# Patient Record
Sex: FEMALE | Race: WHITE | URBAN - METROPOLITAN AREA
[De-identification: names, ages, dates, MRNs, and addresses within clinical notes are randomized per-mention and may not be internally consistent; named-entity substitution may affect disease eponyms.]

---

## 2017-09-15 ENCOUNTER — EMERGENCY (EMERGENCY)
Facility: HOSPITAL | Age: 82
LOS: 0 days | Discharge: HOME | End: 2017-09-15

## 2017-09-15 DIAGNOSIS — T16.2XXA FOREIGN BODY IN LEFT EAR, INITIAL ENCOUNTER: ICD-10-CM

## 2017-09-15 DIAGNOSIS — E78.00 PURE HYPERCHOLESTEROLEMIA, UNSPECIFIED: ICD-10-CM

## 2017-09-15 DIAGNOSIS — I10 ESSENTIAL (PRIMARY) HYPERTENSION: ICD-10-CM

## 2017-09-15 DIAGNOSIS — Z79.899 OTHER LONG TERM (CURRENT) DRUG THERAPY: ICD-10-CM

## 2018-10-16 ENCOUNTER — EMERGENCY (EMERGENCY)
Facility: HOSPITAL | Age: 83
LOS: 0 days | Discharge: HOME | End: 2018-10-17
Attending: EMERGENCY MEDICINE | Admitting: EMERGENCY MEDICINE

## 2018-10-16 VITALS
TEMPERATURE: 98 F | DIASTOLIC BLOOD PRESSURE: 113 MMHG | HEART RATE: 67 BPM | RESPIRATION RATE: 20 BRPM | SYSTOLIC BLOOD PRESSURE: 216 MMHG | HEIGHT: 60 IN | OXYGEN SATURATION: 96 % | WEIGHT: 205.91 LBS

## 2018-10-16 VITALS — DIASTOLIC BLOOD PRESSURE: 76 MMHG | HEART RATE: 63 BPM | OXYGEN SATURATION: 99 % | SYSTOLIC BLOOD PRESSURE: 168 MMHG

## 2018-10-16 DIAGNOSIS — R07.9 CHEST PAIN, UNSPECIFIED: ICD-10-CM

## 2018-10-16 DIAGNOSIS — R07.89 OTHER CHEST PAIN: ICD-10-CM

## 2018-10-16 DIAGNOSIS — Z87.19 PERSONAL HISTORY OF OTHER DISEASES OF THE DIGESTIVE SYSTEM: ICD-10-CM
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
## 2018-10-16 DIAGNOSIS — N39.0 URINARY TRACT INFECTION, SITE NOT SPECIFIED: ICD-10-CM
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
## 2018-10-16 DIAGNOSIS — I10 ESSENTIAL (PRIMARY) HYPERTENSION: ICD-10-CM

## 2018-10-16 DIAGNOSIS — Z79.899 OTHER LONG TERM (CURRENT) DRUG THERAPY: ICD-10-CM

## 2018-10-16 DIAGNOSIS — E78.5 HYPERLIPIDEMIA, UNSPECIFIED: ICD-10-CM

## 2018-10-16 DIAGNOSIS — E78.00 PURE HYPERCHOLESTEROLEMIA, UNSPECIFIED: ICD-10-CM

## 2018-10-16 LAB
ALBUMIN SERPL ELPH-MCNC: 4.2 G/DL — SIGNIFICANT CHANGE UP (ref 3.5–5.2)
ALP SERPL-CCNC: 116 U/L — HIGH (ref 30–115)
ALT FLD-CCNC: 22 U/L — SIGNIFICANT CHANGE UP (ref 0–41)
ANION GAP SERPL CALC-SCNC: 17 MMOL/L — HIGH (ref 7–14)
APPEARANCE UR: ABNORMAL
APTT BLD: 24.3 SEC — LOW (ref 27–39.2)
AST SERPL-CCNC: 30 U/L — SIGNIFICANT CHANGE UP (ref 0–41)
BACTERIA # UR AUTO: ABNORMAL
BILIRUB SERPL-MCNC: 0.7 MG/DL — SIGNIFICANT CHANGE UP (ref 0.2–1.2)
BILIRUB UR-MCNC: NEGATIVE — SIGNIFICANT CHANGE UP
BUN SERPL-MCNC: 11 MG/DL — SIGNIFICANT CHANGE UP (ref 10–20)
CALCIUM SERPL-MCNC: 9.2 MG/DL — SIGNIFICANT CHANGE UP (ref 8.5–10.1)
CHLORIDE SERPL-SCNC: 100 MMOL/L — SIGNIFICANT CHANGE UP (ref 98–110)
CO2 SERPL-SCNC: 23 MMOL/L — SIGNIFICANT CHANGE UP (ref 17–32)
COLOR SPEC: YELLOW — SIGNIFICANT CHANGE UP
COMMENT - URINE: SIGNIFICANT CHANGE UP
CREAT SERPL-MCNC: 0.9 MG/DL — SIGNIFICANT CHANGE UP (ref 0.7–1.5)
DIFF PNL FLD: ABNORMAL
EPI CELLS # UR: ABNORMAL /HPF
GLUCOSE SERPL-MCNC: 131 MG/DL — HIGH (ref 70–99)
GLUCOSE UR QL: NEGATIVE MG/DL — SIGNIFICANT CHANGE UP
HCT VFR BLD CALC: 43.8 % — SIGNIFICANT CHANGE UP (ref 37–47)
HGB BLD-MCNC: 14.1 G/DL — SIGNIFICANT CHANGE UP (ref 12–16)
INR BLD: 1.06 RATIO — SIGNIFICANT CHANGE UP (ref 0.65–1.3)
KETONES UR-MCNC: NEGATIVE — SIGNIFICANT CHANGE UP
LACTATE SERPL-SCNC: 1.7 MMOL/L — SIGNIFICANT CHANGE UP (ref 0.5–2.2)
LEUKOCYTE ESTERASE UR-ACNC: ABNORMAL
LIDOCAIN IGE QN: 31 U/L — SIGNIFICANT CHANGE UP (ref 7–60)
MCHC RBC-ENTMCNC: 28.4 PG — SIGNIFICANT CHANGE UP (ref 27–31)
MCHC RBC-ENTMCNC: 32.2 G/DL — SIGNIFICANT CHANGE UP (ref 32–37)
MCV RBC AUTO: 88.3 FL — SIGNIFICANT CHANGE UP (ref 81–99)
NITRITE UR-MCNC: POSITIVE
NRBC # BLD: 0 /100 WBCS — SIGNIFICANT CHANGE UP (ref 0–0)
PH UR: 6 — SIGNIFICANT CHANGE UP (ref 5–8)
PLATELET # BLD AUTO: 254 K/UL — SIGNIFICANT CHANGE UP (ref 130–400)
POTASSIUM SERPL-MCNC: 4.1 MMOL/L — SIGNIFICANT CHANGE UP (ref 3.5–5)
POTASSIUM SERPL-SCNC: 4.1 MMOL/L — SIGNIFICANT CHANGE UP (ref 3.5–5)
PROT SERPL-MCNC: 7.3 G/DL — SIGNIFICANT CHANGE UP (ref 6–8)
PROT UR-MCNC: NEGATIVE MG/DL — SIGNIFICANT CHANGE UP
PROTHROM AB SERPL-ACNC: 11.5 SEC — SIGNIFICANT CHANGE UP (ref 9.95–12.87)
RBC # BLD: 4.96 M/UL — SIGNIFICANT CHANGE UP (ref 4.2–5.4)
RBC # FLD: 14 % — SIGNIFICANT CHANGE UP (ref 11.5–14.5)
RBC CASTS # UR COMP ASSIST: SIGNIFICANT CHANGE UP /HPF
SODIUM SERPL-SCNC: 140 MMOL/L — SIGNIFICANT CHANGE UP (ref 135–146)
SP GR SPEC: 1.01 — SIGNIFICANT CHANGE UP (ref 1.01–1.03)
TROPONIN T SERPL-MCNC: <0.01 NG/ML — SIGNIFICANT CHANGE UP
UROBILINOGEN FLD QL: 0.2 MG/DL — SIGNIFICANT CHANGE UP (ref 0.2–0.2)
WBC # BLD: 8.28 K/UL — SIGNIFICANT CHANGE UP (ref 4.8–10.8)
WBC # FLD AUTO: 8.28 K/UL — SIGNIFICANT CHANGE UP (ref 4.8–10.8)
WBC UR QL: ABNORMAL /HPF

## 2018-10-16 RX ORDER — CEFTRIAXONE 500 MG/1
1 INJECTION, POWDER, FOR SOLUTION INTRAMUSCULAR; INTRAVENOUS ONCE
Qty: 0 | Refills: 0 | Status: COMPLETED | OUTPATIENT
Start: 2018-10-16 | End: 2018-10-16

## 2018-10-16 RX ADMIN — CEFTRIAXONE 100 GRAM(S): 500 INJECTION, POWDER, FOR SOLUTION INTRAMUSCULAR; INTRAVENOUS at 23:58

## 2018-10-16 NOTE — ED PROVIDER NOTE - OBJECTIVE STATEMENT
89 year old female brought in by family from out patient urgent care center states that today around 1230pm she tripped over her shoes and landed on her right side. patient was able to sit up and bystanders were able to help her up. patient at that time refused EMS and she drove home and went to sleep. Pt states that since fall she has been having chest pain described a pressure and is non radiating. no shortness of breath, no back pain, no headache, no neck pain, no abd pain, no nausea, vomiting, diarrhea, no fever/chills, no numbness no confusion. patient was at urgent care center and was had partial EKG done and was told to come to the ER because their machine only was producing a few lines.

## 2018-10-16 NOTE — ED PROVIDER NOTE - PHYSICAL EXAMINATION
Physical Exam    Vital Signs: I have reviewed the initial vital signs.  Constitutional: well-nourished, appears stated age, no acute distress  Eyes: Conjunctiva pink, Sclera clear, PERRLA, EOMI.  Cardiovascular: S1 and S2, regular rate, regular rhythm, well-perfused extremities, radial pulses equal and 2+  Respiratory: unlabored respiratory effort, clear to auscultation bilaterally no wheezing, rales and rhonchi + chest wall tenderenss anterior no crepdius no step deformity no ecchymosis   Gastrointestinal: soft, non-tender abdomen, no pulsatile mass, normal bowl sounds  Musculoskeletal: supple neck, no lower extremity edema, no midline tenderness  Integumentary: warm, dry, no rash  Neurologic: awake, alert, cranial nerves II-XII grossly intact, extremities’ motor and sensory functions grossly intact  Psychiatric: appropriate mood, appropriate affect

## 2018-10-16 NOTE — ED PROVIDER NOTE - CARE PLAN
Principal Discharge DX:	Fall  Secondary Diagnosis:	Chest pain  Secondary Diagnosis:	UTI (urinary tract infection)

## 2018-10-16 NOTE — ED ADULT TRIAGE NOTE - CHIEF COMPLAINT QUOTE
pt states she fell at 12:10 pm today outside and c/o pain to right side of chest, denies LOC. pt sent by urgent care center for possible ekg changes

## 2018-10-16 NOTE — ED PROVIDER NOTE - MEDICAL DECISION MAKING DETAILS
89yF pmhx HTN HLD  - cardiology eval > 15 years ago p/w chest pain sp fall   - pt  and family at bedside explain - pt was walking - not using her walker tripped  - fell onto right side  -  witnessed by bysstanders -  assisted up - this occured 89yF pmhx HTN HLD  - cardiology eval > 15 years ago p/w chest pain sp fall   - pt  and family at bedside explain - pt was walking - not using her walker tripped  - fell onto right side  -  witnessed by bystanders -  assisted up - this occurred 1230pm  - pt refused  ambulance call -  pt drove home  - sat in recliner - patient  has been having retrosternal chest pressure since fall -  no sob no diaphoresis . + nausea no vomiting, no headache no neck pain no extremity pain ), no neck pain abdominal pain no paresthesias numbness.  PE" Alert and oriented, GCS 15.  PERRL, EOMI, no entrapment.  No raccoon or eagle sign.  No hemotympanum.  NCAT.  Neck is supple, no midline C-Spine tenderness.  CN 2-12 intact.  Motor strength and sensory response is symmetric.  CB intact.  CVS RRR.  Resp CTA b/l.  No distress, speaking clearly.  abd soft NT/ND.  No shoulder, elbow or hand tenderness.  2+ Radial pulse b/l and equal. Full ROM at all joints of b/l UE. No midline vertebral tenderness.  No rib tenderness, no crepitus. No ecchymosis to abd wall, back wall, or chest wall. Pelvis is stable. Hips non tender.  Full ROM at hips, knees and ankles.  No shortening, no rotation.  NVI distally. 89yF pmhx HTN HLD  - cardiology eval > 15 years ago p/w chest pain sp fall   - pt  and family at bedside explain - pt was walking - not using her walker tripped  - fell onto right side  -  witnessed by bystanders -  assisted up - this occurred 1230pm  - pt refused  ambulance call -  pt drove home  - sat in recliner - patient  has been having retrosternal chest pressure since fall -  no sob no diaphoresis . + nausea no vomiting, no headache no neck pain no extremity pain ), no neck pain abdominal pain no paresthesias numbness.  pt went to [facility] after daughter came to her house 2.2chest pain -  EKG machine not functioning - sent to ED   PE" Alert and oriented, GCS 15.  PERRL, EOMI, no entrapment.  No raccoon or eagle sign.  No hemotympanum.  NCAT.  Neck is supple, no midline C-Spine tenderness.  CN 2-12 intact.  Motor strength and sensory response is symmetric.  CB intact.  CVS RRR.  Resp CTA b/l.  No distress, speaking clearly.  abd soft NT/ND.  No shoulder, elbow or hand tenderness.  2+ Radial pulse b/l and equal. Full ROM at all joints of b/l UE. No midline vertebral tenderness.  No rib tenderness, no crepitus. No ecchymosis to abd wall, back wall, or chest wall. Pelvis is stable. Hips non tender.  Full ROM at hips, knees and ankles.  No shortening, no rotation.  NVI distally.

## 2018-10-16 NOTE — ED PROVIDER NOTE - PROGRESS NOTE DETAILS
Results d/w patient and family and copies of results provided.  Pt instructed to return if any worsening symptoms or concerns.  They verbalize understanding. Aware of nodules and f/u with primary medical provider and cardiology. All questions answered by three daughter at bedside. Pt and family comfortable with plan and f/u.

## 2018-10-17 RX ORDER — CEFDINIR 250 MG/5ML
1 POWDER, FOR SUSPENSION ORAL
Qty: 20 | Refills: 0
Start: 2018-10-17 | End: 2018-10-26

## 2018-11-30 ENCOUNTER — EMERGENCY (EMERGENCY)
Facility: HOSPITAL | Age: 83
LOS: 0 days | Discharge: HOME | End: 2018-11-30
Attending: EMERGENCY MEDICINE | Admitting: EMERGENCY MEDICINE

## 2018-11-30 VITALS
SYSTOLIC BLOOD PRESSURE: 171 MMHG | DIASTOLIC BLOOD PRESSURE: 85 MMHG | TEMPERATURE: 97 F | WEIGHT: 192.02 LBS | RESPIRATION RATE: 95 BRPM | OXYGEN SATURATION: 95 % | HEIGHT: 60 IN | HEART RATE: 71 BPM

## 2018-11-30 VITALS
SYSTOLIC BLOOD PRESSURE: 145 MMHG | OXYGEN SATURATION: 95 % | HEART RATE: 74 BPM | RESPIRATION RATE: 17 BRPM | TEMPERATURE: 98 F | DIASTOLIC BLOOD PRESSURE: 67 MMHG

## 2018-11-30 DIAGNOSIS — E78.5 HYPERLIPIDEMIA, UNSPECIFIED: ICD-10-CM

## 2018-11-30 DIAGNOSIS — Z90.49 ACQUIRED ABSENCE OF OTHER SPECIFIED PARTS OF DIGESTIVE TRACT: ICD-10-CM

## 2018-11-30 DIAGNOSIS — Z98.890 OTHER SPECIFIED POSTPROCEDURAL STATES: ICD-10-CM

## 2018-11-30 DIAGNOSIS — R06.02 SHORTNESS OF BREATH: ICD-10-CM

## 2018-11-30 DIAGNOSIS — I10 ESSENTIAL (PRIMARY) HYPERTENSION: ICD-10-CM

## 2018-11-30 DIAGNOSIS — E78.00 PURE HYPERCHOLESTEROLEMIA, UNSPECIFIED: ICD-10-CM

## 2018-11-30 DIAGNOSIS — J18.9 PNEUMONIA, UNSPECIFIED ORGANISM: ICD-10-CM

## 2018-11-30 DIAGNOSIS — Z79.899 OTHER LONG TERM (CURRENT) DRUG THERAPY: ICD-10-CM

## 2018-11-30 DIAGNOSIS — Z79.2 LONG TERM (CURRENT) USE OF ANTIBIOTICS: ICD-10-CM

## 2018-11-30 DIAGNOSIS — K25.5 CHRONIC OR UNSPECIFIED GASTRIC ULCER WITH PERFORATION: Chronic | ICD-10-CM

## 2018-11-30 DIAGNOSIS — Z90.710 ACQUIRED ABSENCE OF BOTH CERVIX AND UTERUS: ICD-10-CM

## 2018-11-30 DIAGNOSIS — Z87.11 PERSONAL HISTORY OF PEPTIC ULCER DISEASE: ICD-10-CM

## 2018-11-30 DIAGNOSIS — E11.9 TYPE 2 DIABETES MELLITUS WITHOUT COMPLICATIONS: ICD-10-CM

## 2018-11-30 DIAGNOSIS — Z90.49 ACQUIRED ABSENCE OF OTHER SPECIFIED PARTS OF DIGESTIVE TRACT: Chronic | ICD-10-CM

## 2018-11-30 DIAGNOSIS — Z90.710 ACQUIRED ABSENCE OF BOTH CERVIX AND UTERUS: Chronic | ICD-10-CM

## 2018-11-30 DIAGNOSIS — Z87.891 PERSONAL HISTORY OF NICOTINE DEPENDENCE: ICD-10-CM

## 2018-11-30 PROBLEM — K27.9 PEPTIC ULCER, SITE UNSPECIFIED, UNSPECIFIED AS ACUTE OR CHRONIC, WITHOUT HEMORRHAGE OR PERFORATION: Chronic | Status: ACTIVE | Noted: 2018-10-16

## 2018-11-30 LAB
ANION GAP SERPL CALC-SCNC: 16 MMOL/L — HIGH (ref 7–14)
BASOPHILS # BLD AUTO: 0.05 K/UL — SIGNIFICANT CHANGE UP (ref 0–0.2)
BASOPHILS NFR BLD AUTO: 0.4 % — SIGNIFICANT CHANGE UP (ref 0–1)
BUN SERPL-MCNC: 12 MG/DL — SIGNIFICANT CHANGE UP (ref 10–20)
CALCIUM SERPL-MCNC: 9.3 MG/DL — SIGNIFICANT CHANGE UP (ref 8.5–10.1)
CHLORIDE SERPL-SCNC: 94 MMOL/L — LOW (ref 98–110)
CO2 SERPL-SCNC: 28 MMOL/L — SIGNIFICANT CHANGE UP (ref 17–32)
CREAT SERPL-MCNC: 1 MG/DL — SIGNIFICANT CHANGE UP (ref 0.7–1.5)
EOSINOPHIL # BLD AUTO: 0 K/UL — SIGNIFICANT CHANGE UP (ref 0–0.7)
EOSINOPHIL NFR BLD AUTO: 0 % — SIGNIFICANT CHANGE UP (ref 0–8)
GLUCOSE SERPL-MCNC: 177 MG/DL — HIGH (ref 70–99)
HCT VFR BLD CALC: 44.9 % — SIGNIFICANT CHANGE UP (ref 37–47)
HGB BLD-MCNC: 15 G/DL — SIGNIFICANT CHANGE UP (ref 12–16)
IMM GRANULOCYTES NFR BLD AUTO: 2.3 % — HIGH (ref 0.1–0.3)
LACTATE SERPL-SCNC: 1.8 MMOL/L — SIGNIFICANT CHANGE UP (ref 0.5–2.2)
LYMPHOCYTES # BLD AUTO: 2.7 K/UL — SIGNIFICANT CHANGE UP (ref 1.2–3.4)
LYMPHOCYTES # BLD AUTO: 22.2 % — SIGNIFICANT CHANGE UP (ref 20.5–51.1)
MCHC RBC-ENTMCNC: 28.3 PG — SIGNIFICANT CHANGE UP (ref 27–31)
MCHC RBC-ENTMCNC: 33.4 G/DL — SIGNIFICANT CHANGE UP (ref 32–37)
MCV RBC AUTO: 84.7 FL — SIGNIFICANT CHANGE UP (ref 81–99)
MONOCYTES # BLD AUTO: 1 K/UL — HIGH (ref 0.1–0.6)
MONOCYTES NFR BLD AUTO: 8.2 % — SIGNIFICANT CHANGE UP (ref 1.7–9.3)
NEUTROPHILS # BLD AUTO: 8.14 K/UL — HIGH (ref 1.4–6.5)
NEUTROPHILS NFR BLD AUTO: 66.9 % — SIGNIFICANT CHANGE UP (ref 42.2–75.2)
NRBC # BLD: 0 /100 WBCS — SIGNIFICANT CHANGE UP (ref 0–0)
PLATELET # BLD AUTO: 424 K/UL — HIGH (ref 130–400)
POTASSIUM SERPL-MCNC: 3.6 MMOL/L — SIGNIFICANT CHANGE UP (ref 3.5–5)
POTASSIUM SERPL-SCNC: 3.6 MMOL/L — SIGNIFICANT CHANGE UP (ref 3.5–5)
RBC # BLD: 5.3 M/UL — SIGNIFICANT CHANGE UP (ref 4.2–5.4)
RBC # FLD: 13.2 % — SIGNIFICANT CHANGE UP (ref 11.5–14.5)
SODIUM SERPL-SCNC: 138 MMOL/L — SIGNIFICANT CHANGE UP (ref 135–146)
WBC # BLD: 12.17 K/UL — HIGH (ref 4.8–10.8)
WBC # FLD AUTO: 12.17 K/UL — HIGH (ref 4.8–10.8)

## 2018-11-30 NOTE — ED PROVIDER NOTE - ATTENDING CONTRIBUTION TO CARE
89F hx of htn, hld, dm, presenting from UC with LLL PNA. Endorses productive cough sputum, with mild sob with exertion. No fever or chills. 89F hx of htn, hld, dm, presenting from  with LLL PNA. Endorses productive cough sputum, with mild sob with exertion. No fever or chills.  VITAL SIGNS: noted  CONSTITUTIONAL: Well-developed; well-nourished; in no acute distress  HEAD: Normocephalic; atraumatic  EYES: conjunctiva and sclera clear  ENT: No nasal discharge; airway clear. MMM  NECK: Supple; non tender. No anterior cervical lymphadenopathy noted  CARD: Regular rate and rhythm  RESP: CTAB/L, no wheezes, rales or rhonchi  ABD: Normal bowel sounds; soft; non-distended; non-tender; No CVAT  EXT: Normal ROM. No calf tenderness or edema. Distal pulses intact  NEURO: Alert, oriented. Grossly unremarkable. No focal deficits  SKIN: Skin exam is warm and dry, no acute rash  MS: No midline spinal tenderness   Cough, sob - consider viral vs bacterial pneumonia, bronchitis, low suspicion for acs - labs, xr, anticipate dc with po abx.

## 2018-11-30 NOTE — ED PROVIDER NOTE - PROGRESS NOTE DETAILS
KATHI White: I was directly involved in the care and management of this patient while supervising PA Fellow

## 2018-11-30 NOTE — ED PROVIDER NOTE - PHYSICAL EXAMINATION
GEN: Alert & Oriented x 3, No acute distress. Calm, appropriate.  HEENT: Oral mucosa pink, moist without lesions. No pharyngeal injection noted. No exudate. No cervical lymphadenopathy.  Eyes: PERRL. No conjunctival injection. No scleral icterus.   RESP: Lungs clear to auscult bilat. no wheezes, rhonchi or rales. No retractions. Equal air entry.  CARDIO: regular rate and rhythm, no murmurs, rubs or gallops. Normal S1, S2.  Radial pulses 2+ bilaterally. No lower extremity edema.  ABD: Soft, Nondistended. No rebound tenderness/guarding.  No pulsatile mass. No tenderness with light and deep palpation.  MS: Full ROM of extremities.  SKIN: no rashes/lesions, no petechiae, no ecchymosis.  NEURO: CN II-XII grossly intact.  Speech and cognition normal.

## 2018-11-30 NOTE — ED PROVIDER NOTE - NSFOLLOWUPINSTRUCTIONS_ED_ALL_ED_FT
Pneumonia    Pneumonia is an infection of the lungs. Pneumonia may be caused by bacteria, viruses, or funguses. Symptoms include coughing, fever, chest pain when breathing deeply or coughing, shortness of breath, fatigue, or muscle aches. Pneumonia can be diagnosed with a medical history and physical exam, as well as other tests which may include a chest X-ray. If you were prescribed an antibiotic medicine, take it as told by your health care provider and do not stop taking the antibiotic even if you start to feel better. Do not use tobacco products, including cigarettes, chewing tobacco, and e-cigarettes.    SEEK IMMEDIATE MEDICAL CARE IF YOU HAVE ANY OF THE FOLLOWING SYMPTOMS: worsening shortness of breath, worsening chest pain, coughing up blood, change in mental status, lightheadedness/dizziness.

## 2018-11-30 NOTE — ED PROVIDER NOTE - MEDICAL DECISION MAKING DETAILS
Xr conerning for pneumonia. Pt remains well appearing. No hypoxia. Afebrile. Discussed risk benefit of admission vs outpatient treatment for CAP with pt and family. Pt has strong preference for outpatient treatment. Family aware and agrees with plan for dc. Patient was given strict return and follow up precautions. The patient has been informed of all concerning signs and symptoms to return to Emergency Department, the necessity to follow up with PMD/Clinic/follow up provided within 2-3 days was explained, and the patient reports understanding of above with capacity and insight.

## 2018-11-30 NOTE — ED PROVIDER NOTE - NS ED ROS FT
GEN: (-) fever, (-) chills  HEENT: (-) vision changes, (-) HA, (-) sore throat, (-) ear pain  CV: (-) chest pain, (-) palpitations, (-) edema  PULM: (+) cough, (-) wheezing, (+) shortness of breath, (-) orthopnea, (-) hemoptysis   GI: (-) abdominal pain,(-) Nausea, (-) Vomiting, (-) Diarrhea, (-) Melena  NEURO: (-) weakness, (-) paresthesias, (-) syncope  : (-) dysuria, (-) frequency, (-) urgency  MS: (-) back pain, (-) joint pain, (-)myalgias, (-) swelling  SKIN: (-) rashes, (-) new lesions, (-) pruritus, (-) jaundice  HEME: (-) bleeding, (-) ecchymosis

## 2018-11-30 NOTE — ED ADULT NURSE NOTE - NSIMPLEMENTINTERV_GEN_ALL_ED
Implemented All Universal Safety Interventions:  [place] to call system. Call bell, personal items and telephone within reach. Instruct patient to call for assistance. Room bathroom lighting operational. Non-slip footwear when patient is off stretcher. Physically safe environment: no spills, clutter or unnecessary equipment. Stretcher in lowest position, wheels locked, appropriate side rails in place.

## 2018-11-30 NOTE — ED PROVIDER NOTE - OBJECTIVE STATEMENT
The patient is a 89y Female with PMH of HTN, Hyperlipidemia and DM is presenting to ED from Urgent care for cough and concern for RLL pneumonia. Patient states she has had a productive cough over the past 1 1/2wks. She notes recent URI symptoms and some shortness of breath worse with exertion. She denies chest pain, fever/chills, abdominal pain n/v/d. She notes she has had chronic diarrhea over 6mo, but states she has had normal BM over the past 2 days.

## 2019-09-26 ENCOUNTER — EMERGENCY (EMERGENCY)
Facility: HOSPITAL | Age: 84
LOS: 0 days | Discharge: HOME | End: 2019-09-26
Attending: EMERGENCY MEDICINE | Admitting: EMERGENCY MEDICINE
Payer: MEDICARE

## 2019-09-26 VITALS
DIASTOLIC BLOOD PRESSURE: 67 MMHG | HEART RATE: 97 BPM | WEIGHT: 199.96 LBS | OXYGEN SATURATION: 96 % | SYSTOLIC BLOOD PRESSURE: 126 MMHG | TEMPERATURE: 96 F | RESPIRATION RATE: 21 BRPM

## 2019-09-26 VITALS
RESPIRATION RATE: 20 BRPM | OXYGEN SATURATION: 97 % | TEMPERATURE: 98 F | SYSTOLIC BLOOD PRESSURE: 135 MMHG | DIASTOLIC BLOOD PRESSURE: 74 MMHG

## 2019-09-26 DIAGNOSIS — R21 RASH AND OTHER NONSPECIFIC SKIN ERUPTION: ICD-10-CM

## 2019-09-26 DIAGNOSIS — R06.2 WHEEZING: ICD-10-CM

## 2019-09-26 DIAGNOSIS — I10 ESSENTIAL (PRIMARY) HYPERTENSION: ICD-10-CM

## 2019-09-26 DIAGNOSIS — E78.00 PURE HYPERCHOLESTEROLEMIA, UNSPECIFIED: ICD-10-CM

## 2019-09-26 DIAGNOSIS — R06.02 SHORTNESS OF BREATH: ICD-10-CM

## 2019-09-26 DIAGNOSIS — K25.5 CHRONIC OR UNSPECIFIED GASTRIC ULCER WITH PERFORATION: Chronic | ICD-10-CM

## 2019-09-26 DIAGNOSIS — E66.9 OBESITY, UNSPECIFIED: ICD-10-CM

## 2019-09-26 DIAGNOSIS — Z90.710 ACQUIRED ABSENCE OF BOTH CERVIX AND UTERUS: Chronic | ICD-10-CM

## 2019-09-26 DIAGNOSIS — Z79.52 LONG TERM (CURRENT) USE OF SYSTEMIC STEROIDS: ICD-10-CM

## 2019-09-26 DIAGNOSIS — R05 COUGH: ICD-10-CM

## 2019-09-26 DIAGNOSIS — Z90.49 ACQUIRED ABSENCE OF OTHER SPECIFIED PARTS OF DIGESTIVE TRACT: Chronic | ICD-10-CM

## 2019-09-26 DIAGNOSIS — Z87.891 PERSONAL HISTORY OF NICOTINE DEPENDENCE: ICD-10-CM

## 2019-09-26 LAB
ALBUMIN SERPL ELPH-MCNC: 4.1 G/DL — SIGNIFICANT CHANGE UP (ref 3.5–5.2)
ALP SERPL-CCNC: 111 U/L — SIGNIFICANT CHANGE UP (ref 30–115)
ALT FLD-CCNC: 18 U/L — SIGNIFICANT CHANGE UP (ref 0–41)
ANION GAP SERPL CALC-SCNC: 12 MMOL/L — SIGNIFICANT CHANGE UP (ref 7–14)
APTT BLD: 25.8 SEC — LOW (ref 27–39.2)
AST SERPL-CCNC: 21 U/L — SIGNIFICANT CHANGE UP (ref 0–41)
BILIRUB SERPL-MCNC: 0.7 MG/DL — SIGNIFICANT CHANGE UP (ref 0.2–1.2)
BUN SERPL-MCNC: 14 MG/DL — SIGNIFICANT CHANGE UP (ref 10–20)
CALCIUM SERPL-MCNC: 9.3 MG/DL — SIGNIFICANT CHANGE UP (ref 8.5–10.1)
CHLORIDE SERPL-SCNC: 103 MMOL/L — SIGNIFICANT CHANGE UP (ref 98–110)
CO2 SERPL-SCNC: 26 MMOL/L — SIGNIFICANT CHANGE UP (ref 17–32)
CREAT SERPL-MCNC: 1 MG/DL — SIGNIFICANT CHANGE UP (ref 0.7–1.5)
GLUCOSE SERPL-MCNC: 193 MG/DL — HIGH (ref 70–99)
HCT VFR BLD CALC: 44.2 % — SIGNIFICANT CHANGE UP (ref 37–47)
HGB BLD-MCNC: 14.4 G/DL — SIGNIFICANT CHANGE UP (ref 12–16)
INR BLD: 1.02 RATIO — SIGNIFICANT CHANGE UP (ref 0.65–1.3)
MAGNESIUM SERPL-MCNC: 1.8 MG/DL — SIGNIFICANT CHANGE UP (ref 1.8–2.4)
MCHC RBC-ENTMCNC: 29 PG — SIGNIFICANT CHANGE UP (ref 27–31)
MCHC RBC-ENTMCNC: 32.6 G/DL — SIGNIFICANT CHANGE UP (ref 32–37)
MCV RBC AUTO: 89.1 FL — SIGNIFICANT CHANGE UP (ref 81–99)
NRBC # BLD: 0 /100 WBCS — SIGNIFICANT CHANGE UP (ref 0–0)
NT-PROBNP SERPL-SCNC: 215 PG/ML — SIGNIFICANT CHANGE UP (ref 0–300)
PLATELET # BLD AUTO: 231 K/UL — SIGNIFICANT CHANGE UP (ref 130–400)
POTASSIUM SERPL-MCNC: 4 MMOL/L — SIGNIFICANT CHANGE UP (ref 3.5–5)
POTASSIUM SERPL-SCNC: 4 MMOL/L — SIGNIFICANT CHANGE UP (ref 3.5–5)
PROT SERPL-MCNC: 7.1 G/DL — SIGNIFICANT CHANGE UP (ref 6–8)
PROTHROM AB SERPL-ACNC: 11.7 SEC — SIGNIFICANT CHANGE UP (ref 9.95–12.87)
RBC # BLD: 4.96 M/UL — SIGNIFICANT CHANGE UP (ref 4.2–5.4)
RBC # FLD: 14 % — SIGNIFICANT CHANGE UP (ref 11.5–14.5)
SODIUM SERPL-SCNC: 141 MMOL/L — SIGNIFICANT CHANGE UP (ref 135–146)
TROPONIN T SERPL-MCNC: <0.01 NG/ML — SIGNIFICANT CHANGE UP
WBC # BLD: 8.95 K/UL — SIGNIFICANT CHANGE UP (ref 4.8–10.8)
WBC # FLD AUTO: 8.95 K/UL — SIGNIFICANT CHANGE UP (ref 4.8–10.8)

## 2019-09-26 PROCEDURE — 93970 EXTREMITY STUDY: CPT | Mod: 26

## 2019-09-26 PROCEDURE — 71046 X-RAY EXAM CHEST 2 VIEWS: CPT | Mod: 26

## 2019-09-26 PROCEDURE — 99285 EMERGENCY DEPT VISIT HI MDM: CPT

## 2019-09-26 RX ORDER — HYDROCORTISONE 1 %
1 OINTMENT (GRAM) TOPICAL
Qty: 60 | Refills: 0
Start: 2019-09-26 | End: 2019-10-02

## 2019-09-26 RX ORDER — IPRATROPIUM/ALBUTEROL SULFATE 18-103MCG
3 AEROSOL WITH ADAPTER (GRAM) INHALATION ONCE
Refills: 0 | Status: COMPLETED | OUTPATIENT
Start: 2019-09-26 | End: 2019-09-26

## 2019-09-26 RX ORDER — IPRATROPIUM/ALBUTEROL SULFATE 18-103MCG
3 AEROSOL WITH ADAPTER (GRAM) INHALATION
Qty: 1 | Refills: 0
Start: 2019-09-26 | End: 2019-10-02

## 2019-09-26 RX ADMIN — Medication 3 MILLILITER(S): at 18:20

## 2019-09-26 NOTE — ED PROVIDER NOTE - OBJECTIVE STATEMENT
89 y/o former smoker presents with family for SOB. patient seen at pmd yesterday and had cxr, given albuterol and z-dhruv. patient without fever,chills, leg swelling, orthopnea, hemoptysis. patient +improvement of symptoms with nebulizer at home. patient denies any cp, diaphoresis, palpitations, syncope. +non productive cough with YAN

## 2019-09-26 NOTE — ED PROVIDER NOTE - PATIENT PORTAL LINK FT
You can access the FollowMyHealth Patient Portal offered by [facility] by registering at the following website: [email or web address]. By joining [facility]’s FollowMyHealth portal, you will also be able to view your health information using other applications (apps) compatible with our system.

## 2019-09-26 NOTE — ED ADULT NURSE NOTE - NSIMPLEMENTINTERV_GEN_ALL_ED
Implemented All Universal Safety Interventions:  [place] to call system. Call bell, personal items and telephone within reach. Instruct patient to call for assistance. Room bathroom lighting operational. Non-slip footwear when patient is off stretcher. Physically safe environment: no spills, clutter or unnecessary equipment. Stretcher in lowest position, wheels locked, appropriate side rails in place.

## 2019-09-26 NOTE — ED PROVIDER NOTE - ATTENDING CONTRIBUTION TO CARE
89 year old female, pmhx as documented above, presenting with worsening dyspnea since yesterday. Seen at PMD office and given albuterol and z-dhruv with mild improvement but states it is still present. Also endorsing (+) non-productive cough as well. Otherwise denies pain and denies fevers, headache, vision changes, weakness/numbness, confusion, URI symptoms, neck pain, chest pain, back pain, palpitations, nausea, vomiting, abdominal pain, diarrhea, constipation, blood in stool/dark stools, urinary symptoms, vaginal bleeding/discharge, leg swelling, rash, recent travel or sick contacts.    Vital Signs: I have reviewed the initial vital signs.  Constitutional: NAD, well-nourished, appears stated age, no acute distress.  HEENT: Airway patent, moist MM, no erythema/swelling/deformity of oral structures. EOMI, PERRLA.  CV: regular rate, regular rhythm, well-perfused extremities, 2+ b/l DP and radial pulses equal.  Lungs: (+) bilateral wheezing, no increased WOB.  ABD: NTND, no guarding or rebound, no pulsatile mass, no hernias.   MSK: Neck supple, nontender, nl ROM, no stepoff. Chest nontender. Back nontender in TLS spine or to b/l bony structures or flanks. Ext nontender, nl rom, no deformity.   INTEG: Skin warm, dry, no rash.  NEURO: A&Ox3, normal strength, nl sensation throughout, normal speech.   PSYCH: Calm, cooperative, normal affect and interaction.    Will tx in ED, CXR, labs, EkG, re-eval.

## 2019-09-26 NOTE — ED PROVIDER NOTE - CARE PROVIDER_API CALL
Jamie Rangel (DO)  Critical Care Medicine; Pulmonary Disease; Sleep Medicine  [address] [place] [address], [place] [address]  Phone: [phone]  Fax: [phone]  Follow Up Time:

## 2019-09-26 NOTE — ED PROVIDER NOTE - CLINICAL SUMMARY MEDICAL DECISION MAKING FREE TEXT BOX
Patient presented with dyspnea since yesterday. Otherwise afebrile, Hd stable, maintaining normal O2 saturation on RA. Obtained labs which were grossly unremarkable, EKG non-ischemic, and CXR negative for pneumothorax or PNA or any other findings. Tx with nebs in ED with significant improvement. Patient ambulatory without desaturation, tolerates PO, feels comfortable going home. Will discharge home with outpatient follow up. Patient agreeable with plan. Agrees to return to ED for any new or worsening symptoms.

## 2019-09-27 PROBLEM — E66.9 OBESITY, UNSPECIFIED: Chronic | Status: ACTIVE | Noted: 2018-11-30

## 2019-09-27 PROBLEM — E11.9 TYPE 2 DIABETES MELLITUS WITHOUT COMPLICATIONS: Chronic | Status: ACTIVE | Noted: 2018-11-30

## 2020-10-14 ENCOUNTER — INPATIENT (INPATIENT)
Facility: HOSPITAL | Age: 85
LOS: 2 days | Discharge: HOME HEALTH | End: 2020-10-17
Attending: STUDENT IN AN ORGANIZED HEALTH CARE EDUCATION/TRAINING PROGRAM | Admitting: STUDENT IN AN ORGANIZED HEALTH CARE EDUCATION/TRAINING PROGRAM
Payer: MEDICARE

## 2020-10-14 VITALS
WEIGHT: 134.92 LBS | OXYGEN SATURATION: 100 % | HEIGHT: 60 IN | HEART RATE: 72 BPM | SYSTOLIC BLOOD PRESSURE: 213 MMHG | DIASTOLIC BLOOD PRESSURE: 89 MMHG | RESPIRATION RATE: 18 BRPM

## 2020-10-14 DIAGNOSIS — Z90.710 ACQUIRED ABSENCE OF BOTH CERVIX AND UTERUS: Chronic | ICD-10-CM

## 2020-10-14 DIAGNOSIS — S22.040A WEDGE COMPRESSION FRACTURE OF FOURTH THORACIC VERTEBRA, INITIAL ENCOUNTER FOR CLOSED FRACTURE: ICD-10-CM

## 2020-10-14 DIAGNOSIS — Z90.49 ACQUIRED ABSENCE OF OTHER SPECIFIED PARTS OF DIGESTIVE TRACT: Chronic | ICD-10-CM

## 2020-10-14 DIAGNOSIS — K25.5 CHRONIC OR UNSPECIFIED GASTRIC ULCER WITH PERFORATION: Chronic | ICD-10-CM

## 2020-10-14 LAB
A1C WITH ESTIMATED AVERAGE GLUCOSE RESULT: 7.1 % — HIGH (ref 4–5.6)
ALBUMIN SERPL ELPH-MCNC: 4.3 G/DL — SIGNIFICANT CHANGE UP (ref 3.5–5.2)
ALP SERPL-CCNC: 103 U/L — SIGNIFICANT CHANGE UP (ref 30–115)
ALT FLD-CCNC: 18 U/L — SIGNIFICANT CHANGE UP (ref 0–41)
ANION GAP SERPL CALC-SCNC: 13 MMOL/L — SIGNIFICANT CHANGE UP (ref 7–14)
ANION GAP SERPL CALC-SCNC: 9 MMOL/L — SIGNIFICANT CHANGE UP (ref 7–14)
APTT BLD: 25.3 SEC — LOW (ref 27–39.2)
AST SERPL-CCNC: 34 U/L — SIGNIFICANT CHANGE UP (ref 0–41)
BASE EXCESS BLDV CALC-SCNC: -2.3 MMOL/L — LOW (ref -2–2)
BASOPHILS # BLD AUTO: 0.02 K/UL — SIGNIFICANT CHANGE UP (ref 0–0.2)
BASOPHILS NFR BLD AUTO: 0.1 % — SIGNIFICANT CHANGE UP (ref 0–1)
BILIRUB SERPL-MCNC: 0.7 MG/DL — SIGNIFICANT CHANGE UP (ref 0.2–1.2)
BUN SERPL-MCNC: 15 MG/DL — SIGNIFICANT CHANGE UP (ref 10–20)
BUN SERPL-MCNC: 16 MG/DL — SIGNIFICANT CHANGE UP (ref 10–20)
CA-I SERPL-SCNC: 1.19 MMOL/L — SIGNIFICANT CHANGE UP (ref 1.12–1.3)
CALCIUM SERPL-MCNC: 8.8 MG/DL — SIGNIFICANT CHANGE UP (ref 8.5–10.1)
CALCIUM SERPL-MCNC: 9.3 MG/DL — SIGNIFICANT CHANGE UP (ref 8.5–10.1)
CHLORIDE SERPL-SCNC: 104 MMOL/L — SIGNIFICANT CHANGE UP (ref 98–110)
CHLORIDE SERPL-SCNC: 105 MMOL/L — SIGNIFICANT CHANGE UP (ref 98–110)
CK SERPL-CCNC: 137 U/L — SIGNIFICANT CHANGE UP (ref 0–225)
CO2 SERPL-SCNC: 22 MMOL/L — SIGNIFICANT CHANGE UP (ref 17–32)
CO2 SERPL-SCNC: 23 MMOL/L — SIGNIFICANT CHANGE UP (ref 17–32)
CREAT SERPL-MCNC: 1 MG/DL — SIGNIFICANT CHANGE UP (ref 0.7–1.5)
CREAT SERPL-MCNC: 1.1 MG/DL — SIGNIFICANT CHANGE UP (ref 0.7–1.5)
EOSINOPHIL # BLD AUTO: 0 K/UL — SIGNIFICANT CHANGE UP (ref 0–0.7)
EOSINOPHIL NFR BLD AUTO: 0 % — SIGNIFICANT CHANGE UP (ref 0–8)
ESTIMATED AVERAGE GLUCOSE: 157 MG/DL — HIGH (ref 68–114)
ETHANOL SERPL-MCNC: <10 MG/DL — SIGNIFICANT CHANGE UP
GAS PNL BLDV: 143 MMOL/L — SIGNIFICANT CHANGE UP (ref 136–145)
GAS PNL BLDV: SIGNIFICANT CHANGE UP
GLUCOSE BLDC GLUCOMTR-MCNC: 108 MG/DL — HIGH (ref 70–99)
GLUCOSE BLDC GLUCOMTR-MCNC: 126 MG/DL — HIGH (ref 70–99)
GLUCOSE BLDC GLUCOMTR-MCNC: 135 MG/DL — HIGH (ref 70–99)
GLUCOSE SERPL-MCNC: 241 MG/DL — HIGH (ref 70–99)
GLUCOSE SERPL-MCNC: 264 MG/DL — HIGH (ref 70–99)
HCO3 BLDV-SCNC: 26 MMOL/L — SIGNIFICANT CHANGE UP (ref 22–29)
HCT VFR BLD CALC: 42.1 % — SIGNIFICANT CHANGE UP (ref 37–47)
HCT VFR BLD CALC: 44.7 % — SIGNIFICANT CHANGE UP (ref 37–47)
HCT VFR BLDA CALC: 46.7 % — HIGH (ref 34–44)
HGB BLD CALC-MCNC: 15.3 G/DL — SIGNIFICANT CHANGE UP (ref 14–18)
HGB BLD-MCNC: 13.4 G/DL — SIGNIFICANT CHANGE UP (ref 12–16)
HGB BLD-MCNC: 14.4 G/DL — SIGNIFICANT CHANGE UP (ref 12–16)
IMM GRANULOCYTES NFR BLD AUTO: 1.4 % — HIGH (ref 0.1–0.3)
INR BLD: 0.94 RATIO — SIGNIFICANT CHANGE UP (ref 0.65–1.3)
LACTATE BLDV-MCNC: 2.5 MMOL/L — HIGH (ref 0.5–1.6)
LACTATE SERPL-SCNC: 2.5 MMOL/L — HIGH (ref 0.7–2)
LIDOCAIN IGE QN: 27 U/L — SIGNIFICANT CHANGE UP (ref 7–60)
LYMPHOCYTES # BLD AUTO: 14.6 % — LOW (ref 20.5–51.1)
LYMPHOCYTES # BLD AUTO: 2.3 K/UL — SIGNIFICANT CHANGE UP (ref 1.2–3.4)
MAGNESIUM SERPL-MCNC: 1.6 MG/DL — LOW (ref 1.8–2.4)
MCHC RBC-ENTMCNC: 28.9 PG — SIGNIFICANT CHANGE UP (ref 27–31)
MCHC RBC-ENTMCNC: 29.1 PG — SIGNIFICANT CHANGE UP (ref 27–31)
MCHC RBC-ENTMCNC: 31.8 G/DL — LOW (ref 32–37)
MCHC RBC-ENTMCNC: 32.2 G/DL — SIGNIFICANT CHANGE UP (ref 32–37)
MCV RBC AUTO: 90.3 FL — SIGNIFICANT CHANGE UP (ref 81–99)
MCV RBC AUTO: 90.7 FL — SIGNIFICANT CHANGE UP (ref 81–99)
MONOCYTES # BLD AUTO: 1.09 K/UL — HIGH (ref 0.1–0.6)
MONOCYTES NFR BLD AUTO: 6.9 % — SIGNIFICANT CHANGE UP (ref 1.7–9.3)
NEUTROPHILS # BLD AUTO: 12.13 K/UL — HIGH (ref 1.4–6.5)
NEUTROPHILS NFR BLD AUTO: 77 % — HIGH (ref 42.2–75.2)
NRBC # BLD: 0 /100 WBCS — SIGNIFICANT CHANGE UP (ref 0–0)
NRBC # BLD: 0 /100 WBCS — SIGNIFICANT CHANGE UP (ref 0–0)
PCO2 BLDV: 59 MMHG — HIGH (ref 41–51)
PH BLDV: 7.26 — SIGNIFICANT CHANGE UP (ref 7.26–7.43)
PHOSPHATE SERPL-MCNC: 3.1 MG/DL — SIGNIFICANT CHANGE UP (ref 2.1–4.9)
PLATELET # BLD AUTO: 165 K/UL — SIGNIFICANT CHANGE UP (ref 130–400)
PLATELET # BLD AUTO: 220 K/UL — SIGNIFICANT CHANGE UP (ref 130–400)
PO2 BLDV: 22 MMHG — SIGNIFICANT CHANGE UP (ref 20–40)
POTASSIUM BLDV-SCNC: 3.8 MMOL/L — SIGNIFICANT CHANGE UP (ref 3.3–5.6)
POTASSIUM SERPL-MCNC: 4.2 MMOL/L — SIGNIFICANT CHANGE UP (ref 3.5–5)
POTASSIUM SERPL-MCNC: 4.5 MMOL/L — SIGNIFICANT CHANGE UP (ref 3.5–5)
POTASSIUM SERPL-SCNC: 4.2 MMOL/L — SIGNIFICANT CHANGE UP (ref 3.5–5)
POTASSIUM SERPL-SCNC: 4.5 MMOL/L — SIGNIFICANT CHANGE UP (ref 3.5–5)
PROT SERPL-MCNC: 7.3 G/DL — SIGNIFICANT CHANGE UP (ref 6–8)
PROTHROM AB SERPL-ACNC: 10.8 SEC — SIGNIFICANT CHANGE UP (ref 9.95–12.87)
RBC # BLD: 4.64 M/UL — SIGNIFICANT CHANGE UP (ref 4.2–5.4)
RBC # BLD: 4.95 M/UL — SIGNIFICANT CHANGE UP (ref 4.2–5.4)
RBC # FLD: 13.9 % — SIGNIFICANT CHANGE UP (ref 11.5–14.5)
RBC # FLD: 14 % — SIGNIFICANT CHANGE UP (ref 11.5–14.5)
SAO2 % BLDV: 29 % — SIGNIFICANT CHANGE UP
SARS-COV-2 RNA SPEC QL NAA+PROBE: SIGNIFICANT CHANGE UP
SODIUM SERPL-SCNC: 137 MMOL/L — SIGNIFICANT CHANGE UP (ref 135–146)
SODIUM SERPL-SCNC: 139 MMOL/L — SIGNIFICANT CHANGE UP (ref 135–146)
TROPONIN T SERPL-MCNC: <0.01 NG/ML — SIGNIFICANT CHANGE UP
WBC # BLD: 14.82 K/UL — HIGH (ref 4.8–10.8)
WBC # BLD: 15.76 K/UL — HIGH (ref 4.8–10.8)
WBC # FLD AUTO: 14.82 K/UL — HIGH (ref 4.8–10.8)
WBC # FLD AUTO: 15.76 K/UL — HIGH (ref 4.8–10.8)

## 2020-10-14 PROCEDURE — 72146 MRI CHEST SPINE W/O DYE: CPT | Mod: 26

## 2020-10-14 PROCEDURE — 93306 TTE W/DOPPLER COMPLETE: CPT | Mod: 26

## 2020-10-14 PROCEDURE — 74177 CT ABD & PELVIS W/CONTRAST: CPT | Mod: 26

## 2020-10-14 PROCEDURE — 72125 CT NECK SPINE W/O DYE: CPT | Mod: 26

## 2020-10-14 PROCEDURE — 99291 CRITICAL CARE FIRST HOUR: CPT

## 2020-10-14 PROCEDURE — 72170 X-RAY EXAM OF PELVIS: CPT | Mod: 26

## 2020-10-14 PROCEDURE — 93010 ELECTROCARDIOGRAM REPORT: CPT | Mod: 76

## 2020-10-14 PROCEDURE — 99223 1ST HOSP IP/OBS HIGH 75: CPT

## 2020-10-14 PROCEDURE — 71045 X-RAY EXAM CHEST 1 VIEW: CPT | Mod: 26

## 2020-10-14 PROCEDURE — 70450 CT HEAD/BRAIN W/O DYE: CPT | Mod: 26

## 2020-10-14 PROCEDURE — 71260 CT THORAX DX C+: CPT | Mod: 26

## 2020-10-14 RX ORDER — DEXTROSE 50 % IN WATER 50 %
25 SYRINGE (ML) INTRAVENOUS ONCE
Refills: 0 | Status: DISCONTINUED | OUTPATIENT
Start: 2020-10-14 | End: 2020-10-15

## 2020-10-14 RX ORDER — SODIUM CHLORIDE 9 MG/ML
1000 INJECTION, SOLUTION INTRAVENOUS
Refills: 0 | Status: DISCONTINUED | OUTPATIENT
Start: 2020-10-14 | End: 2020-10-14

## 2020-10-14 RX ORDER — ATENOLOL 25 MG/1
25 TABLET ORAL ONCE
Refills: 0 | Status: COMPLETED | OUTPATIENT
Start: 2020-10-14 | End: 2020-10-14

## 2020-10-14 RX ORDER — ONDANSETRON 8 MG/1
4 TABLET, FILM COATED ORAL ONCE
Refills: 0 | Status: COMPLETED | OUTPATIENT
Start: 2020-10-14 | End: 2020-10-14

## 2020-10-14 RX ORDER — OXYBUTYNIN CHLORIDE 5 MG
0 TABLET ORAL
Qty: 0 | Refills: 0 | DISCHARGE

## 2020-10-14 RX ORDER — HEPARIN SODIUM 5000 [USP'U]/ML
5000 INJECTION INTRAVENOUS; SUBCUTANEOUS EVERY 8 HOURS
Refills: 0 | Status: DISCONTINUED | OUTPATIENT
Start: 2020-10-14 | End: 2020-10-17

## 2020-10-14 RX ORDER — INSULIN HUMAN 100 [IU]/ML
INJECTION, SOLUTION SUBCUTANEOUS
Refills: 0 | Status: DISCONTINUED | OUTPATIENT
Start: 2020-10-14 | End: 2020-10-15

## 2020-10-14 RX ORDER — ACETAMINOPHEN 500 MG
650 TABLET ORAL EVERY 6 HOURS
Refills: 0 | Status: DISCONTINUED | OUTPATIENT
Start: 2020-10-14 | End: 2020-10-17

## 2020-10-14 RX ORDER — MORPHINE SULFATE 50 MG/1
2 CAPSULE, EXTENDED RELEASE ORAL EVERY 4 HOURS
Refills: 0 | Status: DISCONTINUED | OUTPATIENT
Start: 2020-10-14 | End: 2020-10-14

## 2020-10-14 RX ORDER — SODIUM CHLORIDE 9 MG/ML
1000 INJECTION INTRAMUSCULAR; INTRAVENOUS; SUBCUTANEOUS ONCE
Refills: 0 | Status: COMPLETED | OUTPATIENT
Start: 2020-10-14 | End: 2020-10-14

## 2020-10-14 RX ORDER — ATENOLOL 25 MG/1
25 TABLET ORAL DAILY
Refills: 0 | Status: DISCONTINUED | OUTPATIENT
Start: 2020-10-14 | End: 2020-10-17

## 2020-10-14 RX ORDER — GLUCAGON INJECTION, SOLUTION 0.5 MG/.1ML
1 INJECTION, SOLUTION SUBCUTANEOUS ONCE
Refills: 0 | Status: DISCONTINUED | OUTPATIENT
Start: 2020-10-14 | End: 2020-10-14

## 2020-10-14 RX ORDER — PANTOPRAZOLE SODIUM 20 MG/1
40 TABLET, DELAYED RELEASE ORAL
Refills: 0 | Status: DISCONTINUED | OUTPATIENT
Start: 2020-10-14 | End: 2020-10-17

## 2020-10-14 RX ORDER — TETANUS TOXOID, REDUCED DIPHTHERIA TOXOID AND ACELLULAR PERTUSSIS VACCINE, ADSORBED 5; 2.5; 8; 8; 2.5 [IU]/.5ML; [IU]/.5ML; UG/.5ML; UG/.5ML; UG/.5ML
0.5 SUSPENSION INTRAMUSCULAR ONCE
Refills: 0 | Status: COMPLETED | OUTPATIENT
Start: 2020-10-14 | End: 2020-10-14

## 2020-10-14 RX ORDER — ATENOLOL 25 MG/1
1 TABLET ORAL
Qty: 0 | Refills: 0 | DISCHARGE

## 2020-10-14 RX ORDER — ATENOLOL 25 MG/1
0 TABLET ORAL
Qty: 0 | Refills: 0 | DISCHARGE

## 2020-10-14 RX ORDER — OXYBUTYNIN CHLORIDE 5 MG
5 TABLET ORAL DAILY
Refills: 0 | Status: DISCONTINUED | OUTPATIENT
Start: 2020-10-14 | End: 2020-10-14

## 2020-10-14 RX ORDER — SOLIFENACIN SUCCINATE 10 MG/1
1 TABLET ORAL
Qty: 0 | Refills: 0 | DISCHARGE

## 2020-10-14 RX ORDER — INSULIN HUMAN 100 [IU]/ML
INJECTION, SOLUTION SUBCUTANEOUS EVERY 4 HOURS
Refills: 0 | Status: DISCONTINUED | OUTPATIENT
Start: 2020-10-14 | End: 2020-10-14

## 2020-10-14 RX ORDER — DEXTROSE 50 % IN WATER 50 %
12.5 SYRINGE (ML) INTRAVENOUS ONCE
Refills: 0 | Status: DISCONTINUED | OUTPATIENT
Start: 2020-10-14 | End: 2020-10-14

## 2020-10-14 RX ORDER — DEXTROSE 50 % IN WATER 50 %
15 SYRINGE (ML) INTRAVENOUS ONCE
Refills: 0 | Status: DISCONTINUED | OUTPATIENT
Start: 2020-10-14 | End: 2020-10-14

## 2020-10-14 RX ORDER — OXYCODONE HYDROCHLORIDE 5 MG/1
5 TABLET ORAL EVERY 6 HOURS
Refills: 0 | Status: DISCONTINUED | OUTPATIENT
Start: 2020-10-14 | End: 2020-10-15

## 2020-10-14 RX ORDER — IBUPROFEN 200 MG
600 TABLET ORAL EVERY 8 HOURS
Refills: 0 | Status: DISCONTINUED | OUTPATIENT
Start: 2020-10-14 | End: 2020-10-14

## 2020-10-14 RX ORDER — ATORVASTATIN CALCIUM 80 MG/1
0 TABLET, FILM COATED ORAL
Qty: 0 | Refills: 0 | DISCHARGE

## 2020-10-14 RX ORDER — PANTOPRAZOLE SODIUM 20 MG/1
40 TABLET, DELAYED RELEASE ORAL ONCE
Refills: 0 | Status: DISCONTINUED | OUTPATIENT
Start: 2020-10-14 | End: 2020-10-14

## 2020-10-14 RX ORDER — LEVOTHYROXINE SODIUM 125 MCG
25 TABLET ORAL DAILY
Refills: 0 | Status: DISCONTINUED | OUTPATIENT
Start: 2020-10-14 | End: 2020-10-17

## 2020-10-14 RX ORDER — MAGNESIUM SULFATE 500 MG/ML
2 VIAL (ML) INJECTION
Refills: 0 | Status: COMPLETED | OUTPATIENT
Start: 2020-10-14 | End: 2020-10-14

## 2020-10-14 RX ORDER — OXYBUTYNIN CHLORIDE 5 MG
1 TABLET ORAL
Qty: 0 | Refills: 0 | DISCHARGE

## 2020-10-14 RX ORDER — DEXTROSE 50 % IN WATER 50 %
25 SYRINGE (ML) INTRAVENOUS ONCE
Refills: 0 | Status: DISCONTINUED | OUTPATIENT
Start: 2020-10-14 | End: 2020-10-14

## 2020-10-14 RX ORDER — ATORVASTATIN CALCIUM 80 MG/1
10 TABLET, FILM COATED ORAL DAILY
Refills: 0 | Status: DISCONTINUED | OUTPATIENT
Start: 2020-10-14 | End: 2020-10-17

## 2020-10-14 RX ORDER — MORPHINE SULFATE 50 MG/1
2 CAPSULE, EXTENDED RELEASE ORAL ONCE
Refills: 0 | Status: DISCONTINUED | OUTPATIENT
Start: 2020-10-14 | End: 2020-10-14

## 2020-10-14 RX ADMIN — Medication 650 MILLIGRAM(S): at 13:16

## 2020-10-14 RX ADMIN — MORPHINE SULFATE 2 MILLIGRAM(S): 50 CAPSULE, EXTENDED RELEASE ORAL at 02:43

## 2020-10-14 RX ADMIN — ONDANSETRON 4 MILLIGRAM(S): 8 TABLET, FILM COATED ORAL at 02:22

## 2020-10-14 RX ADMIN — ATENOLOL 25 MILLIGRAM(S): 25 TABLET ORAL at 05:58

## 2020-10-14 RX ADMIN — ONDANSETRON 4 MILLIGRAM(S): 8 TABLET, FILM COATED ORAL at 02:43

## 2020-10-14 RX ADMIN — Medication 650 MILLIGRAM(S): at 17:53

## 2020-10-14 RX ADMIN — MORPHINE SULFATE 2 MILLIGRAM(S): 50 CAPSULE, EXTENDED RELEASE ORAL at 03:19

## 2020-10-14 RX ADMIN — SODIUM CHLORIDE 75 MILLILITER(S): 9 INJECTION, SOLUTION INTRAVENOUS at 06:43

## 2020-10-14 RX ADMIN — HEPARIN SODIUM 5000 UNIT(S): 5000 INJECTION INTRAVENOUS; SUBCUTANEOUS at 14:13

## 2020-10-14 RX ADMIN — HEPARIN SODIUM 5000 UNIT(S): 5000 INJECTION INTRAVENOUS; SUBCUTANEOUS at 21:37

## 2020-10-14 RX ADMIN — Medication 25 GRAM(S): at 06:42

## 2020-10-14 RX ADMIN — ATENOLOL 25 MILLIGRAM(S): 25 TABLET ORAL at 04:43

## 2020-10-14 RX ADMIN — Medication 5 MILLIGRAM(S): at 14:12

## 2020-10-14 RX ADMIN — Medication 650 MILLIGRAM(S): at 06:42

## 2020-10-14 RX ADMIN — SODIUM CHLORIDE 1000 MILLILITER(S): 9 INJECTION INTRAMUSCULAR; INTRAVENOUS; SUBCUTANEOUS at 02:43

## 2020-10-14 RX ADMIN — ATORVASTATIN CALCIUM 10 MILLIGRAM(S): 80 TABLET, FILM COATED ORAL at 14:12

## 2020-10-14 RX ADMIN — Medication 25 MICROGRAM(S): at 06:42

## 2020-10-14 RX ADMIN — HEPARIN SODIUM 5000 UNIT(S): 5000 INJECTION INTRAVENOUS; SUBCUTANEOUS at 06:42

## 2020-10-14 RX ADMIN — PANTOPRAZOLE SODIUM 40 MILLIGRAM(S): 20 TABLET, DELAYED RELEASE ORAL at 06:42

## 2020-10-14 RX ADMIN — Medication 650 MILLIGRAM(S): at 16:55

## 2020-10-14 RX ADMIN — Medication 25 GRAM(S): at 11:08

## 2020-10-14 RX ADMIN — TETANUS TOXOID, REDUCED DIPHTHERIA TOXOID AND ACELLULAR PERTUSSIS VACCINE, ADSORBED 0.5 MILLILITER(S): 5; 2.5; 8; 8; 2.5 SUSPENSION INTRAMUSCULAR at 02:43

## 2020-10-14 RX ADMIN — Medication 650 MILLIGRAM(S): at 17:52

## 2020-10-14 NOTE — H&P ADULT - NSICDXPASTMEDICALHX_GEN_ALL_CORE_FT
PAST MEDICAL HISTORY:  DM (diabetes mellitus)     High blood cholesterol     Hypertension     Obesity     Peptic ulcer

## 2020-10-14 NOTE — ED ADULT TRIAGE NOTE - CHIEF COMPLAINT QUOTE
pt biba after falling down 15 steps, was found by neighbor after hearing neighbor yelling; pt does not remember the fall, unknown LOC; pt has hematoma to forehead, skin tear to right forearm and is c/o lower back pain; not on blood thinners

## 2020-10-14 NOTE — ED ADULT NURSE REASSESSMENT NOTE - NS ED NURSE REASSESS COMMENT FT1
patient awake/alert/oriented. v/s stable. no acute distress. cardiac monitoring in place. iv patent. safety maintained.

## 2020-10-14 NOTE — CONSULT NOTE ADULT - SUBJECTIVE AND OBJECTIVE BOX
89 yo female hx of HTN/HDL/arthritis BIBA c/o closed head injury and upper back pain s/p fall at home. reported she fell down 15 steps at home. doesn't recall how she fell. + HA and nausea. denies LOC. pain to upper back worsen with movement. denies numbness/weakness to upper and lower extremities. denies chest pain/sob/abd    PAST MEDICAL/SURGICAL/FAMILY/SOCIAL HISTORY:    Past Medical History:  DM (diabetes mellitus)    High blood cholesterol    Hypertension    Obesity    Peptic ulcer.     Past Surgical History:  H/O abdominal hysterectomy    History of cholecystectomy    Perforated chronic gastric ulcer.     Tobacco Usage:  · Tobacco Usage	Former smoker    ALLERGIES AND HOME MEDICATIONS:   Allergies:        Allergies:  	No Known Allergies:     Home Medications:   * Patient Currently Takes Medications as of 26-Sep-2019 19:36 documented in Structured Notes  · 	ipratropium-albuterol 0.5 mg-2.5 mg/3 mLinhalation solution: 3 milliliter(s) by nebulizer 3 times a day   · 	Ala-Ja 2.5% topical cream: Apply topically to affected area 2 times a day   · 	predniSONE 50 mg oral tablet: 1 tab(s) orally once a day   · 	Levaquin 750 mg oral tablet: 1 tab(s) orally once a day   · 	cefdinir 300 mg oral capsule: 1 cap(s) orally 2 times a day   · 	VESIcare 10 mg oral tablet: 1 tab(s) orally once a day  · 	oxybutynin:   · 	atorvastatin:   · 	atenolol:   Allergies    No Known Allergies      Vital Signs Last 24 Hrs  T(C): --  T(F): --  HR: 88 (14 Oct 2020 04:39) (72 - 88)  BP: 139/84 (14 Oct 2020 04:39) (139/84 - 213/89)  BP(mean): --  RR: 18 (14 Oct 2020 01:44) (18 - 18)  SpO2: 100% (14 Oct 2020 01:44) (100% - 100%)    PHYSICAL EXAM:    GENERAL: NAD, well-groomed, well-developed  HEAD:  Atraumatic, Normocephalic  EYES: EOMI, PERRLA, conjunctiva and sclera clear  NERVOUS SYSTEM:  Awake  alert oriented to self, place situation   Fund of knowledge, recent and remote memory are intact   Mood; normal affect   CN II-XII intact PERRRL, EOMI, no ptosis, no Nystagmus, normal shoulder shrug   Face symmetrical tongue is midline speech is clear and fluent  Motor: 5/5 UE/LE all muscle groups   Sensory: No deficit to light touch   Gait is not tested      EXTREMITIES:  2+ Peripheral Pulses, No clubbing, cyanosis, or edema      LABS:                        14.4   15.76 )-----------( 220      ( 14 Oct 2020 01:57 )             44.7     10-14    139  |  104  |  15  ----------------------------<  264<H>  4.2   |  22  |  1.1    Ca    9.3      14 Oct 2020 01:57    TPro  7.3  /  Alb  4.3  /  TBili  0.7  /  DBili  x   /  AST  34  /  ALT  18  /  AlkPhos  103  10-14    PT/INR - ( 14 Oct 2020 01:57 )   PT: 10.80 sec;   INR: 0.94 ratio         PTT - ( 14 Oct 2020 01:57 )  PTT:25.3 sec      RADIOLOGY & ADDITIONAL TESTS:  < from: CT Abdomen and Pelvis w/ IV Cont (10.14.20 @ 02:38) >    EXAM:  CT ABDOMEN AND PELVIS IC        EXAM:  CT CHEST IC            PROCEDURE DATE:  10/14/2020            INTERPRETATION:  CLINICAL STATEMENT: Trauma    TECHNIQUE: Contiguous CT images were obtained of the chest, abdomen and pelvis.  Intravenous Contrast:  Intravenous contrast administered.  Oral contrast: was not administered.      COMPARISON:  CT chest, abdomen and pelvis dated 10/16/2018    FINDINGS:    CHEST:    LUNGS, PLEURA AND AIRWAYS: Motion limited evaluation of the lungs, particularly for subtle pulmonary nodules. There is centrilobular emphysema. Mild predominantly dependent subsegmental atelectasis. No focal consolidation, pleural effusion or pneumothorax. No evidence for central obstructing endobronchial lesion    HEART AND VESSELS: Heart size is within normal limits. No pericardial effusion. Aortic and coronary artery calcifications. Normal caliber main pulmonary artery and thoracic aorta.    THORACIC INLET/MEDIASTINUM/LYMPH NODES: Enlarged hypodense left thyroid gland nodule measuring at least 3 cm. On the current study there is suggestion of substernal extension. Nonspecific mild stranding in this region. There is rightward tracheal deviation. No enlarged thoracic lymph nodes.    BONES AND SOFT TISSUES: There is a severe compression fracture of T4, new and favored to be acute with retropulsion of the posterior inferior aspect of the vertebral body. Likely additional superior endplate compression fracture of T5. Age-indeterminate sternal fracture. Correlate for point tenderness.    ABDOMEN/PELVIS:    LIVER: Unremarkable.    SPLEEN: Unremarkable.    PANCREAS: Unremarkable.    GALLBLADDER AND BILIARY TREE: Cholecystectomy.    ADRENALS: Unremarkable.    KIDNEYS: Symmetric pattern of renal enhancement. No hydronephrosis. Right renal cyst and bilateral hypodensities too small to characterize    LYMPH NODES: There are no enlarged abdominal or pelvic lymph nodes.    VASCULATURE: Atherosclerotic abdominal aorta with stable focal ectasia to 2.7 cm just inferior to the renal arteries    BOWEL: No bowel obstruction or bowel wall thickening. Unremarkable appendix. Colonic diverticulosis without evidence of diverticulitis. Surgical clips in the region of the GE junction.    PERITONEUM/RETROPERITONEUM/MESENTERY: There is no ascites or pneumoperitoneum. Fat-containing ventral epigastric hernia    PELVIC VISCERA: Hysterectomy. Underdistention limits evaluation of the urinary bladder    BONES AND SOFT TISSUES: No acute osseous abnormality is noted.      IMPRESSION:    Severe acute appearing compression fracture of T4, with retropulsion of the posterior inferior aspect of the vertebral body. Likely additional superior endplate compression fracture of T5.  Age-indeterminate sternal fracture. Correlate for pointtenderness.    If not already performed, nonemergent thyroid ultrasound is recommended for evaluation of a left thyroid gland nodule with possible substernal extension.    No evidence for acute traumatic injury to the abdomen or pelvis          Spokewith OSCAR HINSON PA on 10/14/2020 3:25 AM with readback regarding fractures.                HANNAH MERINO M.D., ATTENDING RADIOLOGIST  This document has been electronically signed. Oct 14 2020  3:25AM    < end of copied text >

## 2020-10-14 NOTE — ED PROVIDER NOTE - PHYSICAL EXAMINATION
CONSTITUTIONAL: Well-appearing; well-nourished; in no apparent distress.   EYES: PERRL; EOM intact.   CARDIOVASCULAR: Normal S1, S2; no murmurs, rubs, or gallops.   RESPIRATORY: Normal chest excursion with respiration; breath sounds clear and equal bilaterally; no wheezes, rhonchi, or rales.  GI/: Normal bowel sounds; non-distended; non-tender; no palpable organomegaly.   MS: + ttp to T4-5 level midline tenderness. pelvis/hips non-tender. no extremities tenderness. no chest wall tenderness.   SKIN: small 2cm skin tear to right forearm. no active bleeding.   NEURO/PSYCH: A & O x 3; grossly unremarkable. move all extremities. speech clear. CONSTITUTIONAL: Well-appearing; well-nourished; in no apparent distress.   EYES: PERRL; EOM intact.   CARDIOVASCULAR: Normal S1, S2; no murmurs, rubs, or gallops.   RESPIRATORY: Normal chest excursion with respiration; breath sounds clear and equal bilaterally; no wheezes, rhonchi, or rales.  GI/: Normal bowel sounds; non-distended; non-tender; no palpable organomegaly.   MS: + ttp to T4-5 level midline tenderness. pelvis/hips non-tender. no extremities tenderness. + ttp anterior chest wall tenderness to sternum. no ribs tenderness b/l   SKIN: small 2cm skin tear to right forearm. no active bleeding.   NEURO/PSYCH: A & O x 3; grossly unremarkable. move all extremities. speech clear.

## 2020-10-14 NOTE — ED PROVIDER NOTE - OBJECTIVE STATEMENT
89 yo female hx of HTN/HDL/arthritis BIBA c/o closed head injury and upper back pain s/p fall at home. reported she fell down 15 steps at home. doesn't recall how she fell. + HA and nausea. denies LOC. pain to upper back worsen with movement. denies numbness/weakness to upper and lower extremities. denies chest pain/sob/abd pain/vomiting/diarrhea/recent illness and urinary sxs.   also report skin tear to right forearm. doesn't recall last tetanus.

## 2020-10-14 NOTE — CONSULT NOTE ADULT - ASSESSMENT
"Ms. King is a 63yo lady with multiple past medical problems (see below), who has ESRD on HD M/W/F due to longstanding uncontrolled DM2 and renovascular HTN.  She has always had "bad balance" due to her obesity, age and neuropathy, but over the past year she has noticed an up-tick in her falls.  Over the past 4 months this has almost doubled, and now over the past few weeks she has fallen 8 times.  Her problems first started with symptoms of right sided sciatica pain (sharp pain radiating down the back of her leg) with weakness.  This led to her guarding the right side with overuse of her left leg.  After a week or so of this and still falling, she suddenly noticed that her left leg would, "just suddenly give out" on her.  She would go to make a step and on her left foot touching, it was, "like the leg just didn't have any strength and down I'd go"  Her right knee is extremely painful to any pressure or movement and still with some sciatica symptoms.  She is now having difficulty ambulating.  She has some right buttock pain as well, but no harry lumbar back pain.  She did see her PCP and an Orthopedist for these symptoms.  She was given a Rx for Norflex, which did seem to assist some with the pain, but actually made her balance significantly worse, so she stopped it after 3 days.     Due to the poor balance and fear of using the steps, she missed her HD session on Monday.  She was planning to go today, but again fell and struck her knees and her right lower ribcage.  She feels, "a little knot there now."  To complicate things, she also recently had cataract surgery (1/30/19), so this is impeding her vision to some degree (along with the dark sunglasses).  " T4 Fx w/ retropulsion

## 2020-10-14 NOTE — H&P ADULT - HISTORY OF PRESENT ILLNESS
TRAUMA ACTIVATION LEVEL:  Consult    MECHANISM OF INJURY:   [] Blunt     [] MVC	  [x] Fall	  [] Pedestrian Struck	  [] Motorcycle     [] Assault     [] Bicycle collision    [] Sports injury    [] Penetrating    [] Gun Shot Wound      [] Stab Wound    GCS: 15 	E: 4	V: 5	M: 6    HPI:  89yF w/ PMHx of Hypothyroidism with thyroid nodules, HTN, Hysterectomy, Laparoscopic Cholecystectomy 15 years ago, and Perforated gastric ulcer 27 years ago seen as trauma consult s/p fall down 15 steps at home earlier this evening. Patient reports that she does not remember how she fell, but + Head trauma, - LoC, and denies anticoagulation. In the ED, patient complains of headache, mild anterior chest pain, and back pain.  Trauma assessment in ED: ABCs intact , GCS 15 , AAOx3.    In the ED, imaging done which showed fracture to the sternum and compression fracture T4. Neurosurgery was consulted and requested MRI of thoracic spine.

## 2020-10-14 NOTE — CONSULT NOTE ADULT - SUBJECTIVE AND OBJECTIVE BOX
SICU Consultation Note  ======================================================================================================  HIEN SANCHEZ  MRN-[id number]      89y Female  s/p mechanical fall down a flight of stairs, + Head trauma, No LOC, No Anticoagulation, presents to the ED. She states that around noon on 10/13, she was carrying a box up the stairs, and her "legs gave out" and she fell down the flight of stairs and was unable to get up. She did lay in the floor for a prolonged period of time, until her neighbors eventually heard her and were able to call for help. She currently lives alone and is mostly independent. She has had another fall about a year ago where she also fell due to her       Consult:  Consult Note Adult-Trauma Surgery Resident [DEWAYNE Dee]  Consult Note Adult-Neurosurgery Physician Assistant [AWAIS Kern]      Pertinent Imaging:  Echo-  CT-      Procedure:  OR Stats  OR Time:               EBL:          IV Fluids:       Blood Products:                UOP:      Findings-    PMH  PAST MEDICAL & SURGICAL HISTORY:  Obesity    DM (diabetes mellitus)    Peptic ulcer    Hypertension    High blood cholesterol    Perforated chronic gastric ulcer    H/O abdominal hysterectomy    History of cholecystectomy        Home Meds:  Home Medications:  acetaminophen-codeine #3: 1 tab(s) orally 2 times a day, As Needed (14 Oct 2020 05:45)  atenolol 25 mg oral tablet: 1 tab(s) orally once a day (14 Oct 2020 05:45)  atorvastatin 10 mg oral tablet: 1 tab(s) orally once a day (14 Oct 2020 05:45)  levothyroxine 25 mcg (0.025 mg) oral tablet: 1 tab(s) orally once a day (14 Oct 2020 05:45)  meloxicam 7.5 mg oral tablet: 1 tab(s) orally once a day, As Needed (14 Oct 2020 05:45)  nystatin 100,000 units/g topical powder: Apply topically to affected area 3 times a day, As Needed (14 Oct 2020 05:45)  oxybutynin 5 mg/24 hours oral tablet, extended release: 1 tab(s) orally once a day (14 Oct 2020 05:45)         Allergies  Allergies    No Known Allergies    Intolerances         Current Medications:  acetaminophen   Tablet .. 650 milliGRAM(s) Oral every 6 hours  ATENolol  Tablet 25 milliGRAM(s) Oral daily  atorvastatin Oral Tab/Cap - Peds 10 milliGRAM(s) Oral daily  heparin   Injectable 5000 Unit(s) SubCutaneous every 8 hours  ibuprofen  Tablet. 600 milliGRAM(s) Oral every 8 hours PRN Mild Pain (1 - 3)  lactated ringers. 1000 milliLiter(s) (75 mL/Hr) IV Continuous <Continuous>  levothyroxine 25 MICROGram(s) Oral daily  oxybutynin 5 milliGRAM(s) Oral daily  oxyCODONE    IR 5 milliGRAM(s) Oral every 6 hours PRN Moderate Pain (4 - 6)  pantoprazole  Injectable 40 milliGRAM(s) IV Push Once      Advanced Directives: Presumed Full Code    VITAL SIGNS, INS/OUTS (Last 24hours):    ICU Vital Signs Last 24 Hrs  T(C): --  T(F): --  HR: 88 (14 Oct 2020 04:39) (72 - 88)  BP: 139/84 (14 Oct 2020 04:39) (139/84 - 213/89)  BP(mean): --  ABP: --  ABP(mean): --  RR: 18 (14 Oct 2020 01:44) (18 - 18)  SpO2: 100% (14 Oct 2020 01:44) (100% - 100%)    I&O's Summary      Height (cm): 152.4 (10-14-20)  Weight (kg): 61.2 (10-14-20)  BMI (kg/m2): 26.4 (10-14-20)  BSA (m2): 1.58 (10-14-20)    Physical Exam:   ---------------------------------------------------------------------------------------  GCS:      Exam: A&Ox3, no focal deficits    RESPIRATORY:  Normal expansion/effort  Mechanical Ventilation:     CARDIOVASCULAR:   S1/S2.  RRR  No peripheral edema    GASTROINTESTINAL:  Abdomen soft, non-tender, non-distended    MUSCULOSKELETAL:  Extremities warm, pink, well-perfused.    DERM:  No skin breakdown     :   Exam: Dean catheter in place.       Tubes/Lines/Drains   ----------------------------------------------------------------------------------------------------------  [x] Peripheral IV  [X] Central Venous Line          IJ/Femoral             Date Placed:    [X] Arterial Line		      Radial/Femoral    Date Placed:   [X] PICC:         	  [X] Midline		                                  Date Placed:   [X] Urinary Catheter Dean                                             Date Placed:       LABS  --------------------------------------------------------------------------------------  LFTs  LIVER FUNCTIONS - ( 14 Oct 2020 01:57 )  Alb: 4.3 g/dL / Pro: 7.3 g/dL / ALK PHOS: 103 U/L / ALT: 18 U/L / AST: 34 U/L / GGT: x             Cardiac Markers  CARDIAC MARKERS ( 14 Oct 2020 03:40 )  x     / <0.01 ng/mL / x     / x     / x      CARDIAC MARKERS ( 14 Oct 2020 02:02 )  x     / x     / 137 U/L / x     / x            Coagulation  PT/INR - ( 14 Oct 2020 01:57 )   PT: 10.80 sec;   INR: 0.94 ratio         PTT - ( 14 Oct 2020 01:57 )  PTT:25.3 sec    Arterial Blood Gas      Blood Sugar  CAPILLARY BLOOD GLUCOSE          Urinalysis      Cultures          CT/XRAY/ECHO/TCD/EEG  ----------------------------------------------------------------------------------------------            --------------------------------------------------------------------------------------  Admit Diagnosis: COMPRESSION FRACTURE OF T4 VERTEBRA;STERNAL FRACTURE;CLOSED HEAD INJURY          SICU Consultation Note  ======================================================================================================  HIEN SANCHEZ  MRN-[id number]      89y Female  s/p mechanical fall down a flight of stairs, + Head trauma, No LOC, No Anticoagulation, presents to the ED. She states that around noon on 10/13, she was carrying a box up the stairs, and her "legs gave out" and she fell down the flight of stairs and was unable to get up. She did lay in the floor for a prolonged period of time, until her neighbors eventually heard her and were able to call for help. She currently lives alone and is mostly independent. She has had another fall about a year ago where she also fell due to her "legs giving out." She denies any headaches, dizziness or lightheadedness immediately prior to falling or currently, and endorses only a mild chest/back discomfort. Denies chest pain with activity.    Review of Systems:  General: denies fever, chills  Head: + Trauma; denies headache, nausea; vomiting or visual change  Cardiac: hypertension, murmurs, angina, palpitations, dyspnea on exertion, orthopnea, paroxysmal nocturnal dyspnea, edema, last ECG  Respiratory: Endorses occasional Shortness of breath and chest tightness that is spontaneous and non-reproducible.   GI: denies nausea, vomiting, indigestion, dysphagia, Endorse chronic diarrhea and associated incontinence.    Vascular: denies Leg edema, claudication, varicose veins, thromboses/emboli  Musculoskeletal: denies muscle weakness, pain  Neurologic: loss of sensation/numbness, tingling, tremors, weakness/paralysis, fainting/blackouts, seizures  Endocrine: Endorses hypothyroidism as well as known nodule, diabetes  Psychiatric: appropriate mood and affect      Consult:  Consult Note Adult-Trauma Surgery Resident [DEWAYNE Dee]  Consult Note Adult-Neurosurgery Physician Assistant [AWAIS Kern]      Pertinent Imaging:  Echo-   CT-t< from: CT Abdomen and Pelvis w/ IV Cont (10.14.20 @ 02:38) >  Severe acute appearing compression fracture of T4, with retropulsion of the posterior inferior aspect of the vertebral body. Likely additional superior endplate compression fracture of T5.  Age-indeterminate sternal fracture. Correlate for pointtenderness.    If not already performed, nonemergent thyroid ultrasound is recommended for evaluation of a left thyroid gland nodule with possible substernal extension.    No evidence for acute traumatic injury to the abdomen or pelvis          < end of copied text >      PMH  PAST MEDICAL & SURGICAL HISTORY:  Obesity    DM (diabetes mellitus)    Peptic ulcer    Hypertension    High blood cholesterol    Perforated chronic gastric ulcer    H/O abdominal hysterectomy    History of cholecystectomy        Home Meds:  Home Medications:  acetaminophen-codeine #3: 1 tab(s) orally 2 times a day, As Needed (14 Oct 2020 05:45)  atenolol 25 mg oral tablet: 1 tab(s) orally once a day (14 Oct 2020 05:45)  atorvastatin 10 mg oral tablet: 1 tab(s) orally once a day (14 Oct 2020 05:45)  levothyroxine 25 mcg (0.025 mg) oral tablet: 1 tab(s) orally once a day (14 Oct 2020 05:45)  meloxicam 7.5 mg oral tablet: 1 tab(s) orally once a day, As Needed (14 Oct 2020 05:45)  nystatin 100,000 units/g topical powder: Apply topically to affected area 3 times a day, As Needed (14 Oct 2020 05:45)  oxybutynin 5 mg/24 hours oral tablet, extended release: 1 tab(s) orally once a day (14 Oct 2020 05:45)         Allergies  Allergies    No Known Allergies    Intolerances         Current Medications:  acetaminophen   Tablet .. 650 milliGRAM(s) Oral every 6 hours  ATENolol  Tablet 25 milliGRAM(s) Oral daily  atorvastatin Oral Tab/Cap - Peds 10 milliGRAM(s) Oral daily  heparin   Injectable 5000 Unit(s) SubCutaneous every 8 hours  ibuprofen  Tablet. 600 milliGRAM(s) Oral every 8 hours PRN Mild Pain (1 - 3)  lactated ringers. 1000 milliLiter(s) (75 mL/Hr) IV Continuous <Continuous>  levothyroxine 25 MICROGram(s) Oral daily  oxybutynin 5 milliGRAM(s) Oral daily  oxyCODONE    IR 5 milliGRAM(s) Oral every 6 hours PRN Moderate Pain (4 - 6)  pantoprazole  Injectable 40 milliGRAM(s) IV Push Once      Advanced Directives: Presumed Full Code    VITAL SIGNS, INS/OUTS (Last 24hours):    ICU Vital Signs Last 24 Hrs  HR: 88 (14 Oct 2020 04:39) (72 - 88)  BP: 139/84 (14 Oct 2020 04:39) (139/84 - 213/89)  RR: 18 (14 Oct 2020 01:44) (18 - 18)  SpO2: 100% (14 Oct 2020 01:44) (100% - 100%)    I&O's Summary      Height (cm): 152.4 (10-14-20)  Weight (kg): 61.2 (10-14-20)  BMI (kg/m2): 26.4 (10-14-20)  BSA (m2): 1.58 (10-14-20)    Physical Exam:   ---------------------------------------------------------------------------------------  GCS:    15  Exam: A&Ox3, no focal deficits  Sensation to light touch and motor intact in bilateral upper and lower extremities.     RESPIRATORY:  Normal expansion/effort  Chest wall Tender to moderate palpation on mid-portion of sternum    CARDIOVASCULAR:   S1/S2.  RRR  No peripheral edema    GASTROINTESTINAL:  Abdomen soft, non-tender, non-distended    MUSCULOSKELETAL:  Extremities warm, pink, well-perfused.    DERM:  No skin breakdown       Tubes/Lines/Drains   ----------------------------------------------------------------------------------------------------------  [x] Peripheral IV    LABS  --------------------------------------------------------------------------------------  LFTs  LIVER FUNCTIONS - ( 14 Oct 2020 01:57 )  Alb: 4.3 g/dL / Pro: 7.3 g/dL / ALK PHOS: 103 U/L / ALT: 18 U/L / AST: 34 U/L / GGT: x             Cardiac Markers  CARDIAC MARKERS ( 14 Oct 2020 03:40 )  x     / <0.01 ng/mL / x     / x     / x      CARDIAC MARKERS ( 14 Oct 2020 02:02 )  x     / x     / 137 U/L / x     / x            Coagulation  PT/INR - ( 14 Oct 2020 01:57 )   PT: 10.80 sec;   INR: 0.94 ratio         PTT - ( 14 Oct 2020 01:57 )  PTT:25.3 sec    Arterial Blood Gas      Blood Sugar  CAPILLARY BLOOD GLUCOSE          Urinalysis      Cultures          CT/XRAY/ECHO/TCD/EEG  ----------------------------------------------------------------------------------------------            --------------------------------------------------------------------------------------  Admit Diagnosis: COMPRESSION FRACTURE OF T4 VERTEBRA;STERNAL FRACTURE;CLOSED HEAD INJURY

## 2020-10-14 NOTE — CONSULT NOTE ADULT - ASSESSMENT
Assessment & Plan    89y Female  s/p fall with T4 fracture with possible retropulsion, T5 fracture and possible sternal fracture.     NEURO:    Acute pain-multimodal pain control    RESP:     Oxygen insufficiency-wean off NC to RA as tolerate    Activity- strict bedrest      CARDS:   EKG Reviewed  Troponin negative, trend x3  MAP > 65    ATENolol  Tablet 25 milliGRAM(s) Oral daily      GI/NUTR:     NPO    GI Prophylaxis- PPI    Bowel regimen- senna      pantoprazole    Tablet 40 milliGRAM(s) Oral before breakfast      /RENAL:       Strict I/O-    BUN/Cr- BUN/Cr- 16/1.0   <----,  15/1.1   <----             HEME/ONC:       DVT prophylaxis-    Acute anemia-H/H 13.4 (10-14 @ 05:40)  14.4 (10-14 @ 01:57)        ID:  no active issues  Afebrile  WBC 14.82        ENDO:  q4 POC glucose with SSI    Glucose Glucose, Serum: 241 (10-14 @ 05:40)      HA1C     LINES/DRAINS:  Maria D MARCELO Foley     DISPO:    SICU Assessment & Plan    89y Female  s/p fall with T4 fracture with possible retropulsion, T5 fracture and possible sternal fracture.     NEURO:    Acute pain-multimodal pain control    RESP:     Oxygen insufficiency-wean off NC to RA as tolerate    Activity- strict bedrest      CARDS:   EKG Reviewed  Troponin negative, trend x3  MAP > 65    ATENolol  Tablet 25 milliGRAM(s) Oral daily      GI/NUTR:     NPO    GI Prophylaxis- PPI    Bowel regimen- holding due to self reported history of chronic diarrhea/incontinence. Will start senna if needed      pantoprazole    Tablet 40 milliGRAM(s) Oral before breakfast      /RENAL:       Strict I/O-    BUN/Cr- BUN/Cr- 16/1.0   <----,  15/1.1   <----             HEME/ONC:       DVT prophylaxis-    Acute anemia-H/H 13.4 (10-14 @ 05:40)  14.4 (10-14 @ 01:57)        ID:  no active issues  Afebrile  WBC 14.82        ENDO:  q4 POC glucose with SSI    Glucose Glucose, Serum: 241 (10-14 @ 05:40)      HA1C     LINES/DRAINS:  FOZIA, Dianne Killian     DISPO:    SICU

## 2020-10-14 NOTE — ED PROVIDER NOTE - CARE PLAN
Principal Discharge DX:	Compression fracture of T4 vertebra, initial encounter  Secondary Diagnosis:	Sternal fracture  Secondary Diagnosis:	Accidental fall  Secondary Diagnosis:	Closed head injury  Secondary Diagnosis:	Skin tear of forearm without complication

## 2020-10-14 NOTE — ED PROVIDER NOTE - PROGRESS NOTE DETAILS
GW: Trauma and neurosurgery aware of consultation SICU/Neurosurgery evaluated patient, will admit to Step-down unit

## 2020-10-14 NOTE — ED PROVIDER NOTE - NS ED ROS FT
Constitutional: no fever, chills, no recent weight loss, change in appetite or malaise  Eyes: no redness/discharge/pain/vision changes  ENT: no rhinorrhea/ear pain/sore throat  Cardiac: No chest pain, SOB or edema.  Respiratory: No cough or respiratory distress  GI: No nausea, vomiting, diarrhea or abdominal pain.  : No dysuria, frequency, urgency or hematuria  MS: + pain to back + chronic knee extremities, no loss of ROM, no weakness  Neuro: + headache No weakness. No LOC.  Skin: + skin tear  Endocrine: No history of thyroid disease or diabetes.

## 2020-10-14 NOTE — H&P ADULT - NSHPLABSRESULTS_GEN_ALL_CORE
Labs:  CAPILLARY BLOOD GLUCOSE                              14.4   15.76 )-----------( 220      ( 14 Oct 2020 01:57 )             44.7       Auto Immature Granulocyte %: 1.4 % (10-14-20 @ 01:57)  Auto Neutrophil %: 77.0 % (10-14-20 @ 01:57)    10-14    139  |  104  |  15  ----------------------------<  264<H>  4.2   |  22  |  1.1      Calcium, Total Serum: 9.3 mg/dL (10-14-20 @ 01:57)      LFTs:             7.3  | 0.7  | 34       ------------------[103     ( 14 Oct 2020 01:57 )  4.3  | x    | 18          Lipase:27     Amylase:x         Blood Gas Venous - Lactate: 2.5 mmoL/L (10-14-20 @ 02:12)  Lactate, Blood: 2.5 mmol/L (10-14-20 @ 01:57)      Coags:     10.80  ----< 0.94    ( 14 Oct 2020 01:57 )     25.3        CARDIAC MARKERS ( 14 Oct 2020 03:40 )  x     / <0.01 ng/mL / x     / x     / x      CARDIAC MARKERS ( 14 Oct 2020 02:02 )  x     / x     / 137 U/L / x     / x            Alcohol, Blood: <10 mg/dL (10-14-20 @ 01:57)    RADIOLOGY & ADDITIONAL STUDIES:    < from: CT Head No Cont (10.14.20 @ 02:30) >  IMPRESSION:    Motion degraded examination.    No evidence for acute intracranial hemorrhage or territorial infarct was identified.    < end of copied text >      < from: CT Cervical Spine No Cont (10.14.20 @ 02:33) >  IMPRESSION:    No evidence of acute cervical spine fracture or subluxation.    Multilevel degenerative changes.    ADDITIONAL ATTENDING COMMENT:  Agree no evidence for acute cervical spine fracture.  There is an acute minimally displaced fracture of the right posterior first rib and nondisplaced fracture of the right posterior second rib.  There are acute mildly displaced fractures of the left posterior first and second ribs.  Suspect nondisplaced fracture of the left transverse process of T1.    < end of copied text >      < from: CT Chest Abd and Pelv w/ IV Cont (10.14.20 @ 02:38) >  IMPRESSION:    Severe acute appearing compression fracture of T4, with retropulsion of the posterior inferior aspect of the vertebral body. Likely additional superior endplate compression fracture of T5.  Age-indeterminate sternal fracture. Correlate for pointtenderness.    If not already performed, nonemergent thyroid ultrasound is recommended for evaluation of a left thyroid gland nodule with possible substernal extension.    No evidence for acute traumatic injury to the abdomen or pelvis    Spokewith OSCAR HINSON PA on 10/14/2020 3:25 AM with readback regarding fractures.    < end of copied text >

## 2020-10-14 NOTE — H&P ADULT - NSHPPHYSICALEXAM_GEN_ALL_CORE
Primary Survey:    A - airway intact  B - bilateral breath sounds and good chest rise  C - palpable pulses in all extremities  D - GCS 15 on arrival, POLLACK  Exposure obtained    Vital Signs Last 24 Hrs  T(C): --  T(F): --  HR: 88 (14 Oct 2020 04:39) (72 - 88)  BP: 139/84 (14 Oct 2020 04:39) (139/84 - 213/89)  BP(mean): --  RR: 18 (14 Oct 2020 01:44) (18 - 18)  SpO2: 100% (14 Oct 2020 01:44) (100% - 100%)    Secondary Survey:   General: NAD  HEENT: Normocephalic, atraumatic, +tenderness to forehead, EOMI, PEERLA. no scalp lacerations   Neck: Soft, midline trachea. no c-spine tenderness  Chest: + Sternal tenderness, no subcutaneous emphysema   Cardiac: S1, S2, RRR  Respiratory: Bilateral breath sounds, clear and equal bilaterally  Abdomen: Soft, non-distended, non-tender, no rebound, no guarding.  Groin: Normal appearing, pelvis stable   Ext:  Moving b/l upper and lower extremities. Strength 5/5. Palpable Radial b/l UE, b/l DP palpable in LE.   Back: Thoracic spine tenderness, No palpable runoff/stepoff/deformity

## 2020-10-14 NOTE — H&P ADULT - ASSESSMENT
ASSESSMENT:  89yF w/ PMHx of Hypothyroidism with thyroid nodules, HTN, Hysterectomy, Laparoscopic Cholecystectomy 15 years ago, and Perforated gastric ulcer 27 years ago seen as trauma consult s/p fall down 15 steps at home earlier this evening. Patient reports that she does not remember how she fell, but + Head trauma, - LoC, and denies anticoagulation. In the ED, patient complains of headache, mild anterior chest pain, and back pain.  Trauma assessment in ED: ABCs intact , GCS 15 , AAOx3.    Injuries identified:     There is an acute minimally displaced fracture of the right posterior first rib and nondisplaced fracture of the right posterior second rib.  There are acute mildly displaced fractures of the left posterior first and second ribs.  Suspect nondisplaced fracture of the left transverse process of T1.  Severe acute appearing compression fracture of T4, with retropulsion of the posterior inferior aspect of the vertebral body. Likely additional superior endplate compression fracture of T5.  Age-indeterminate sternal fracture.    PLAN:   - Admit to trauma service  - Follow MRI of T-spine    Additional consultations:  - Neurosurgery  Recommendation: Bedrest for now MRI of Thoracic spine w/o ana    Disposition pending results of above labs and imaging  Above plan discussed with Trauma attending, Dr. Cullen, patient, patient family, and ED team ASSESSMENT:  89yF w/ PMHx of Hypothyroidism with thyroid nodules, HTN, Hysterectomy, Laparoscopic Cholecystectomy 15 years ago, and Perforated gastric ulcer 27 years ago seen as trauma consult s/p fall down 15 steps at home earlier this evening. Patient reports that she does not remember how she fell, but + Head trauma, - LoC, and denies anticoagulation. In the ED, patient complains of headache, mild anterior chest pain, and back pain.  Trauma assessment in ED: ABCs intact , GCS 15 , AAOx3.    Injuries identified:     There is an acute minimally displaced fracture of the right posterior first rib and nondisplaced fracture of the right posterior second rib.  There are acute mildly displaced fractures of the left posterior first and second ribs.  Suspect nondisplaced fracture of the left transverse process of T1.  Severe acute appearing compression fracture of T4, with retropulsion of the posterior inferior aspect of the vertebral body. Likely additional superior endplate compression fracture of T5.  Age-indeterminate sternal fracture.    PLAN:   - Admit to trauma service  - Follow MRI of T-spine    Additional consultations:  - Neurosurgery  Recommendation: Bedrest for now MRI of Thoracic spine w/o ana    Disposition pending results of above labs and imaging  Above plan discussed with Trauma attending, Dr. Cullen, patient, patient family, and ED team    Senior Note  I have personally examined and evaluated the patient  I agree with the above plan and note, and I have edited where appropriate  SICU for HD monitoring in the setting of poly trauma and advanced age    Surgical Attending aware and agrees with plan

## 2020-10-14 NOTE — CONSULT NOTE ADULT - SUBJECTIVE AND OBJECTIVE BOX
TRAUMA ACTIVATION LEVEL:      MECHANISM OF INJURY:   [] Blunt     [] MVC	  [x] Fall	  [] Pedestrian Struck	  [] Motorcycle     [] Assault     [] Bicycle collision    [] Sports injury    [] Penetrating    [] Gun Shot Wound      [] Stab Wound    GCS: 15 	E: 4	V: 5	M: 6    HPI:  89yF w/ PMHx of Hypothyroidism with thyroid nodules, HTN, Hysterectomy, Laparoscopic Cholecystectomy 15 years ago, and Perforated gastric ulcer 27 years ago seen as trauma consult s/p fall down 15 steps at home earlier this evening. Patient reports that she does not remember how she fell, but + Head trauma, - LoC, and denies anticoagulation. In the ED, patient complains of headache, mild anterior chest pain, and back pain.  Trauma assessment in ED: ABCs intact , GCS 15 , AAOx3.    In the ED, imaging done which showed fracture to the sternum and compression fracture T4. Neurosurgery was consulted and requested MRI of thoracic spine.    PAST MEDICAL & SURGICAL HISTORY:  Obesity    DM (diabetes mellitus)    Peptic ulcer    Hypertension    High blood cholesterol    Perforated chronic gastric ulcer    H/O abdominal hysterectomy    History of cholecystectomy        Allergies    No Known Allergies    Intolerances        Home Medications:  atenolol:  (30 Nov 2018 15:34)  atorvastatin:  (30 Nov 2018 15:34)  oxybutynin:  (30 Nov 2018 15:35)  VESIcare 10 mg oral tablet: 1 tab(s) orally once a day (30 Nov 2018 15:35)  Azithromycin  Levothyroxine  Meloxicam  Nystatin      ROS: 10-system review is otherwise negative except HPI above.      Primary Survey:    A - airway intact  B - bilateral breath sounds and good chest rise  C - palpable pulses in all extremities  D - GCS 15 on arrival, POLLACK  Exposure obtained    Vital Signs Last 24 Hrs  T(C): --  T(F): --  HR: 88 (14 Oct 2020 04:39) (72 - 88)  BP: 139/84 (14 Oct 2020 04:39) (139/84 - 213/89)  BP(mean): --  RR: 18 (14 Oct 2020 01:44) (18 - 18)  SpO2: 100% (14 Oct 2020 01:44) (100% - 100%)    Secondary Survey:   General: NAD  HEENT: Normocephalic, atraumatic, +tenderness to forehead, EOMI, PEERLA. no scalp lacerations   Neck: Soft, midline trachea. no c-spine tenderness  Chest: + Sternal tenderness, no subcutaneous emphysema   Cardiac: S1, S2, RRR  Respiratory: Bilateral breath sounds, clear and equal bilaterally  Abdomen: Soft, non-distended, non-tender, no rebound, no guarding.  Groin: Normal appearing, pelvis stable   Ext:  Moving b/l upper and lower extremities. Strength 5/5. Palpable Radial b/l UE, b/l DP palpable in LE.   Back: Thoracic spine tenderness, No palpable runoff/stepoff/deformity      Labs:  CAPILLARY BLOOD GLUCOSE                              14.4   15.76 )-----------( 220      ( 14 Oct 2020 01:57 )             44.7       Auto Immature Granulocyte %: 1.4 % (10-14-20 @ 01:57)  Auto Neutrophil %: 77.0 % (10-14-20 @ 01:57)    10-14    139  |  104  |  15  ----------------------------<  264<H>  4.2   |  22  |  1.1      Calcium, Total Serum: 9.3 mg/dL (10-14-20 @ 01:57)      LFTs:             7.3  | 0.7  | 34       ------------------[103     ( 14 Oct 2020 01:57 )  4.3  | x    | 18          Lipase:27     Amylase:x         Blood Gas Venous - Lactate: 2.5 mmoL/L (10-14-20 @ 02:12)  Lactate, Blood: 2.5 mmol/L (10-14-20 @ 01:57)      Coags:     10.80  ----< 0.94    ( 14 Oct 2020 01:57 )     25.3        CARDIAC MARKERS ( 14 Oct 2020 03:40 )  x     / <0.01 ng/mL / x     / x     / x      CARDIAC MARKERS ( 14 Oct 2020 02:02 )  x     / x     / 137 U/L / x     / x            Alcohol, Blood: <10 mg/dL (10-14-20 @ 01:57)            Alcohol, Blood: <10 mg/dL (10-14-20 @ 01:57)      RADIOLOGY & ADDITIONAL STUDIES:    < from: CT Head No Cont (10.14.20 @ 02:30) >  IMPRESSION:    Motion degraded examination.    No evidence for acute intracranial hemorrhage or territorial infarct was identified.    < end of copied text >      < from: CT Cervical Spine No Cont (10.14.20 @ 02:33) >  IMPRESSION:    No evidence of acute cervical spine fracture or subluxation.    Multilevel degenerative changes.    ADDITIONAL ATTENDING COMMENT:  Agree no evidence for acute cervical spine fracture.  There is an acute minimally displaced fracture of the right posterior first rib and nondisplaced fracture of the right posterior second rib.  There are acute mildly displaced fractures of the left posterior first and second ribs.  Suspect nondisplaced fracture of the left transverse process of T1.    < end of copied text >      < from: CT Chest w/ IV Cont (10.14.20 @ 02:38) >  IMPRESSION:    Severe acute appearing compression fracture of T4, with retropulsion of the posterior inferior aspect of the vertebral body. Likely additional superior endplate compression fracture of T5.  Age-indeterminate sternal fracture. Correlate for pointtenderness.    If not already performed, nonemergent thyroid ultrasound is recommended for evaluation of a left thyroid gland nodule with possible substernal extension.    No evidence for acute traumatic injury to the abdomen or pelvis    Spokewith OSCAR HINSON PA on 10/14/2020 3:25 AM with readback regarding fractures.    < end of copied text >      < from: CT Abdomen and Pelvis w/ IV Cont (10.14.20 @ 02:38) >  IMPRESSION:    Severe acute appearing compression fracture of T4, with retropulsion of the posterior inferior aspect of the vertebral body. Likely additional superior endplate compression fracture of T5.  Age-indeterminate sternal fracture. Correlate for pointtenderness.    If not already performed, nonemergent thyroid ultrasound is recommended for evaluation of a left thyroid gland nodule with possible substernal extension.    No evidence for acute traumatic injury to the abdomen or pelvis      Spokewith OSCAR HINSON PA on 10/14/2020 3:25 AM with readback regarding fractures.    < end of copied text >    ---------------------------------------------------------------------------------------    ASSESSMENT:  89yF w/ PMHx of Hypothyroidism with thyroid nodules, HTN, Hysterectomy, Laparoscopic Cholecystectomy 15 years ago, and Perforated gastric ulcer 27 years ago seen as trauma consult s/p fall down 15 steps at home earlier this evening. Patient reports that she does not remember how she fell, but + Head trauma, - LoC, and denies anticoagulation. In the ED, patient complains of headache, mild anterior chest pain, and back pain.  Trauma assessment in ED: ABCs intact , GCS 15 , AAOx3.    Injuries identified:     There is an acute minimally displaced fracture of the right posterior first rib and nondisplaced fracture of the right posterior second rib.  There are acute mildly displaced fractures of the left posterior first and second ribs.  Suspect nondisplaced fracture of the left transverse process of T1.  Severe acute appearing compression fracture of T4, with retropulsion of the posterior inferior aspect of the vertebral body. Likely additional superior endplate compression fracture of T5.  Age-indeterminate sternal fracture.    PLAN:   - Admit to trauma service    Additional consultations:  - Neurosurgery  Recommendation: Bedrest for now MRI of Thoracic spine w/o ana    Disposition pending results of above labs and imaging  Above plan discussed with Trauma attending, Dr. Cullen, patient, patient family, and ED team    --------------------------------------------------------------------------------------  10-14-20 @ 05:22

## 2020-10-14 NOTE — CONSULT NOTE ADULT - ATTENDING COMMENTS
I examined the patient and discussed my plan with the resident  the patient is a fall with rib and vertebral fractures.  in the setting of advanced age the patient should be admitted to a monitored setting

## 2020-10-14 NOTE — ED PROVIDER NOTE - ATTENDING CONTRIBUTION TO CARE
I personally evaluated the patient. I reviewed the Resident’s or Physician Assistant’s note (as assigned above), and agree with the findings and plan except as documented in my note.    89 yo female hx of HTN/HDL/arthritis BIBA c/o closed head injury and upper back pain s/p fall at home. reported she fell down 15 steps at home. doesn't recall how she fell. + HA and nausea. No vomiting. No abd pain.     CONSTITUTIONAL: NAD  SKIN: skin exam is warm and dry, no acute rash.  HEAD: Normocephalic; atraumatic.  EYES: PERRL, 3 mm bilateral, no nystagmus, EOM intact; conjunctiva and sclera clear.  ENT: No nasal discharge; airway clear.  NECK: Supple; non tender.+ full passive ROM in all directions. No JVD  CARD: S1, S2 normal; no murmurs, gallops, or rubs. Regular rate and rhythm. + Symmetric Strong Pulses  RESP: No wheezes, rales or rhonchi. Good air movement bilaterally  ABD: soft; non-distended; non-tender.   EXT: Normal ROM.   NEURO: CN 2-12 grossly intact,  no sensory or motor deficits, GCS 15. NIH 0  PSYCH: Cooperative     Plan- trauma alert, PAN CT, ECG, labs, reassess

## 2020-10-14 NOTE — H&P ADULT - NSICDXPASTSURGICALHX_GEN_ALL_CORE_FT
PAST SURGICAL HISTORY:  H/O abdominal hysterectomy     History of cholecystectomy     Perforated chronic gastric ulcer

## 2020-10-14 NOTE — PROGRESS NOTE ADULT - SUBJECTIVE AND OBJECTIVE BOX
Subjective: 89yFemale with a pmhx of COMPRESSION FRACTURE OF T4 VERTEBRA;STERNAL FRACTURE;CLOSED HEAD INJURY    ^FALL    Handoff    MEWS Score    Obesity    DM (diabetes mellitus)    Peptic ulcer    Hypertension    High blood cholesterol    Compression fracture of T4 vertebra, initial encounter    Compression fracture of T4 vertebra, initial encounter    Perforated chronic gastric ulcer    H/O abdominal hysterectomy    History of cholecystectomy    FALL    90+    Skin tear of forearm without complication    Closed head injury    Accidental fall    Sternal fracture    [email or web address]    89 yo female hx of HTN/HDL/arthritis BIBA c/o closed head injury and upper back pain s/p fall at home. reported she fell down 15 steps at home. doesn't recall how she fell.       Allergies    No Known Allergies    Intolerances        Vital Signs Last 24 Hrs  T(C): 36.2 (14 Oct 2020 09:30), Max: 36.2 (14 Oct 2020 07:25)  T(F): 97.2 (14 Oct 2020 09:30), Max: 97.2 (14 Oct 2020 09:30)  HR: 70 (14 Oct 2020 11:00) (70 - 88)  BP: 144/63 (14 Oct 2020 11:00) (136/66 - 213/89)  BP(mean): 90 (14 Oct 2020 11:00) (90 - 94)  RR: 20 (14 Oct 2020 07:25) (18 - 20)  SpO2: 98% (14 Oct 2020 11:00) (98% - 100%)      acetaminophen   Tablet .. 650 milliGRAM(s) Oral every 6 hours  ATENolol  Tablet 25 milliGRAM(s) Oral daily  atorvastatin Oral Tab/Cap - Peds 10 milliGRAM(s) Oral daily  dextrose 50% Injectable 12.5 Gram(s) IV Push once  dextrose 50% Injectable 25 Gram(s) IV Push once  dextrose 50% Injectable 25 Gram(s) IV Push once  heparin   Injectable 5000 Unit(s) SubCutaneous every 8 hours  ibuprofen  Tablet. 600 milliGRAM(s) Oral every 8 hours PRN  insulin regular  human corrective regimen sliding scale   SubCutaneous every 4 hours  lactated ringers. 1000 milliLiter(s) IV Continuous <Continuous>  levothyroxine 25 MICROGram(s) Oral daily  oxybutynin 5 milliGRAM(s) Oral daily  oxyCODONE    IR 5 milliGRAM(s) Oral every 6 hours PRN  pantoprazole    Tablet 40 milliGRAM(s) Oral before breakfast        10-14-20 @ 07:01  -  10-14-20 @ 12:56  --------------------------------------------------------  IN: 275 mL / OUT: 125 mL / NET: 150 mL        REVIEW OF SYSTEMS    [ X ] A ten-point review of systems was otherwise negative except as noted.  [ ] Due to altered mental status/intubation, subjective information were not able to be obtained from the patient. History was obtained, to the extent possible, from review of the chart and collateral sources of information.      Exam:  AAOX3. Verbal function intact  tongue midline, facial motions symmetric  PERRLA, EOMI  Motor: MAEx4, 5/5 power in b/l UE and LE  Sensation: intact to touch in all extremities  +point tenderness to thoracic spine        CBC Full  -  ( 14 Oct 2020 05:40 )  WBC Count : 14.82 K/uL  RBC Count : 4.64 M/uL  Hemoglobin : 13.4 g/dL  Hematocrit : 42.1 %  Platelet Count - Automated : 165 K/uL  Mean Cell Volume : 90.7 fL  Mean Cell Hemoglobin : 28.9 pg  Mean Cell Hemoglobin Concentration : 31.8 g/dL    10-14    137  |  105  |  16  ----------------------------<  241<H>  4.5   |  23  |  1.0    Ca    8.8      14 Oct 2020 05:40  Phos  3.1     10-14  Mg     1.6     10-14    TPro  7.3  /  Alb  4.3  /  TBili  0.7  /  DBili  x   /  AST  34  /  ALT  18  /  AlkPhos  103  10-14    PT/INR - ( 14 Oct 2020 01:57 )   PT: 10.80 sec;   INR: 0.94 ratio         PTT - (  Imaging:    < from: CT Chest w/ IV Cont (10.14.20 @ 02:38) >  IMPRESSION:    Severe acute appearing compression fracture of T4, with retropulsion of the posterior inferior aspect of the vertebral body. Likely additional superior endplate compression fracture of T5.  Age-indeterminate sternal fracture. Correlate for pointtenderness.    If not already performed, nonemergent thyroid ultrasound is recommended for evaluation of a left thyroid gland nodule with possible substernal extension.    No evidence for acute traumatic injury to the abdomen or pelvis      Spokewith OSCAR HINSON PA on 10/14/2020 3:25 AM with readback regarding fractures.    < end of copied text >      Assessment/Plan:   s/p fall with t4 compression fx with retropulsion, T5 compression fx   - seen at bedside with Dr. Naidu, has back pain   - keep in bed until MRI done due to retropulsion  - Christiano brace script given to   - MRI T spine  - d/w attending Dr. Naidu

## 2020-10-14 NOTE — PATIENT PROFILE ADULT - ABILITY TO HEAR (WITH HEARING AID OR HEARING APPLIANCE IF NORMALLY USED):
Mildly to Moderately Impaired: difficulty hearing in some environments or speaker may need to increase volume or speak distinctly/pt has one hearing aid in and stated she lost the other

## 2020-10-15 LAB
ANION GAP SERPL CALC-SCNC: 13 MMOL/L — SIGNIFICANT CHANGE UP (ref 7–14)
ANION GAP SERPL CALC-SCNC: 8 MMOL/L — SIGNIFICANT CHANGE UP (ref 7–14)
APTT BLD: 28.2 SEC — SIGNIFICANT CHANGE UP (ref 27–39.2)
BASOPHILS # BLD AUTO: 0.01 K/UL — SIGNIFICANT CHANGE UP (ref 0–0.2)
BASOPHILS NFR BLD AUTO: 0.1 % — SIGNIFICANT CHANGE UP (ref 0–1)
BLD GP AB SCN SERPL QL: SIGNIFICANT CHANGE UP
BUN SERPL-MCNC: 10 MG/DL — SIGNIFICANT CHANGE UP (ref 10–20)
BUN SERPL-MCNC: 12 MG/DL — SIGNIFICANT CHANGE UP (ref 10–20)
CALCIUM SERPL-MCNC: 8.4 MG/DL — LOW (ref 8.5–10.1)
CALCIUM SERPL-MCNC: 8.4 MG/DL — LOW (ref 8.5–10.1)
CHLORIDE SERPL-SCNC: 106 MMOL/L — SIGNIFICANT CHANGE UP (ref 98–110)
CHLORIDE SERPL-SCNC: 109 MMOL/L — SIGNIFICANT CHANGE UP (ref 98–110)
CK MB CFR SERPL CALC: 2.8 NG/ML — SIGNIFICANT CHANGE UP (ref 0.6–6.3)
CK SERPL-CCNC: 297 U/L — HIGH (ref 0–225)
CO2 SERPL-SCNC: 22 MMOL/L — SIGNIFICANT CHANGE UP (ref 17–32)
CO2 SERPL-SCNC: 23 MMOL/L — SIGNIFICANT CHANGE UP (ref 17–32)
CREAT SERPL-MCNC: 0.8 MG/DL — SIGNIFICANT CHANGE UP (ref 0.7–1.5)
CREAT SERPL-MCNC: 0.8 MG/DL — SIGNIFICANT CHANGE UP (ref 0.7–1.5)
EOSINOPHIL # BLD AUTO: 0 K/UL — SIGNIFICANT CHANGE UP (ref 0–0.7)
EOSINOPHIL NFR BLD AUTO: 0 % — SIGNIFICANT CHANGE UP (ref 0–8)
GLUCOSE BLDC GLUCOMTR-MCNC: 130 MG/DL — HIGH (ref 70–99)
GLUCOSE BLDC GLUCOMTR-MCNC: 146 MG/DL — HIGH (ref 70–99)
GLUCOSE BLDC GLUCOMTR-MCNC: 148 MG/DL — HIGH (ref 70–99)
GLUCOSE BLDC GLUCOMTR-MCNC: 173 MG/DL — HIGH (ref 70–99)
GLUCOSE BLDC GLUCOMTR-MCNC: 194 MG/DL — HIGH (ref 70–99)
GLUCOSE SERPL-MCNC: 156 MG/DL — HIGH (ref 70–99)
GLUCOSE SERPL-MCNC: 174 MG/DL — HIGH (ref 70–99)
HCT VFR BLD CALC: 38.5 % — SIGNIFICANT CHANGE UP (ref 37–47)
HCT VFR BLD CALC: 38.6 % — SIGNIFICANT CHANGE UP (ref 37–47)
HGB BLD-MCNC: 12.5 G/DL — SIGNIFICANT CHANGE UP (ref 12–16)
HGB BLD-MCNC: 12.6 G/DL — SIGNIFICANT CHANGE UP (ref 12–16)
IMM GRANULOCYTES NFR BLD AUTO: 0.4 % — HIGH (ref 0.1–0.3)
INR BLD: 0.98 RATIO — SIGNIFICANT CHANGE UP (ref 0.65–1.3)
LYMPHOCYTES # BLD AUTO: 1.28 K/UL — SIGNIFICANT CHANGE UP (ref 1.2–3.4)
LYMPHOCYTES # BLD AUTO: 14.3 % — LOW (ref 20.5–51.1)
MAGNESIUM SERPL-MCNC: 1.9 MG/DL — SIGNIFICANT CHANGE UP (ref 1.8–2.4)
MAGNESIUM SERPL-MCNC: 2.3 MG/DL — SIGNIFICANT CHANGE UP (ref 1.8–2.4)
MCHC RBC-ENTMCNC: 28.9 PG — SIGNIFICANT CHANGE UP (ref 27–31)
MCHC RBC-ENTMCNC: 29 PG — SIGNIFICANT CHANGE UP (ref 27–31)
MCHC RBC-ENTMCNC: 32.5 G/DL — SIGNIFICANT CHANGE UP (ref 32–37)
MCHC RBC-ENTMCNC: 32.6 G/DL — SIGNIFICANT CHANGE UP (ref 32–37)
MCV RBC AUTO: 88.7 FL — SIGNIFICANT CHANGE UP (ref 81–99)
MCV RBC AUTO: 88.9 FL — SIGNIFICANT CHANGE UP (ref 81–99)
MONOCYTES # BLD AUTO: 0.9 K/UL — HIGH (ref 0.1–0.6)
MONOCYTES NFR BLD AUTO: 10 % — HIGH (ref 1.7–9.3)
NEUTROPHILS # BLD AUTO: 6.75 K/UL — HIGH (ref 1.4–6.5)
NEUTROPHILS NFR BLD AUTO: 75.2 % — SIGNIFICANT CHANGE UP (ref 42.2–75.2)
NRBC # BLD: 0 /100 WBCS — SIGNIFICANT CHANGE UP (ref 0–0)
NRBC # BLD: 0 /100 WBCS — SIGNIFICANT CHANGE UP (ref 0–0)
PHOSPHATE SERPL-MCNC: 2.8 MG/DL — SIGNIFICANT CHANGE UP (ref 2.1–4.9)
PHOSPHATE SERPL-MCNC: 3 MG/DL — SIGNIFICANT CHANGE UP (ref 2.1–4.9)
PLATELET # BLD AUTO: 141 K/UL — SIGNIFICANT CHANGE UP (ref 130–400)
PLATELET # BLD AUTO: 146 K/UL — SIGNIFICANT CHANGE UP (ref 130–400)
POTASSIUM SERPL-MCNC: 4 MMOL/L — SIGNIFICANT CHANGE UP (ref 3.5–5)
POTASSIUM SERPL-MCNC: 4.2 MMOL/L — SIGNIFICANT CHANGE UP (ref 3.5–5)
POTASSIUM SERPL-SCNC: 4 MMOL/L — SIGNIFICANT CHANGE UP (ref 3.5–5)
POTASSIUM SERPL-SCNC: 4.2 MMOL/L — SIGNIFICANT CHANGE UP (ref 3.5–5)
PROTHROM AB SERPL-ACNC: 11.3 SEC — SIGNIFICANT CHANGE UP (ref 9.95–12.87)
RBC # BLD: 4.33 M/UL — SIGNIFICANT CHANGE UP (ref 4.2–5.4)
RBC # BLD: 4.35 M/UL — SIGNIFICANT CHANGE UP (ref 4.2–5.4)
RBC # FLD: 14.2 % — SIGNIFICANT CHANGE UP (ref 11.5–14.5)
RBC # FLD: 14.2 % — SIGNIFICANT CHANGE UP (ref 11.5–14.5)
SARS-COV-2 IGG SERPL QL IA: NEGATIVE — SIGNIFICANT CHANGE UP
SARS-COV-2 IGM SERPL IA-ACNC: <0.1 INDEX — SIGNIFICANT CHANGE UP
SODIUM SERPL-SCNC: 140 MMOL/L — SIGNIFICANT CHANGE UP (ref 135–146)
SODIUM SERPL-SCNC: 141 MMOL/L — SIGNIFICANT CHANGE UP (ref 135–146)
TROPONIN T SERPL-MCNC: <0.01 NG/ML — SIGNIFICANT CHANGE UP
WBC # BLD: 7.79 K/UL — SIGNIFICANT CHANGE UP (ref 4.8–10.8)
WBC # BLD: 8.98 K/UL — SIGNIFICANT CHANGE UP (ref 4.8–10.8)
WBC # FLD AUTO: 7.79 K/UL — SIGNIFICANT CHANGE UP (ref 4.8–10.8)
WBC # FLD AUTO: 8.98 K/UL — SIGNIFICANT CHANGE UP (ref 4.8–10.8)

## 2020-10-15 PROCEDURE — 73562 X-RAY EXAM OF KNEE 3: CPT | Mod: 26,RT

## 2020-10-15 PROCEDURE — 99232 SBSQ HOSP IP/OBS MODERATE 35: CPT

## 2020-10-15 PROCEDURE — 70450 CT HEAD/BRAIN W/O DYE: CPT | Mod: 26

## 2020-10-15 PROCEDURE — 71045 X-RAY EXAM CHEST 1 VIEW: CPT | Mod: 26

## 2020-10-15 RX ORDER — LABETALOL HCL 100 MG
10 TABLET ORAL ONCE
Refills: 0 | Status: COMPLETED | OUTPATIENT
Start: 2020-10-15 | End: 2020-10-15

## 2020-10-15 RX ORDER — HALOPERIDOL DECANOATE 100 MG/ML
5 INJECTION INTRAMUSCULAR ONCE
Refills: 0 | Status: COMPLETED | OUTPATIENT
Start: 2020-10-15 | End: 2020-10-15

## 2020-10-15 RX ORDER — INSULIN HUMAN 100 [IU]/ML
INJECTION, SOLUTION SUBCUTANEOUS
Refills: 0 | Status: DISCONTINUED | OUTPATIENT
Start: 2020-10-15 | End: 2020-10-17

## 2020-10-15 RX ORDER — LABETALOL HCL 100 MG
10 TABLET ORAL ONCE
Refills: 0 | Status: DISCONTINUED | OUTPATIENT
Start: 2020-10-15 | End: 2020-10-17

## 2020-10-15 RX ORDER — GABAPENTIN 400 MG/1
100 CAPSULE ORAL EVERY 8 HOURS
Refills: 0 | Status: DISCONTINUED | OUTPATIENT
Start: 2020-10-15 | End: 2020-10-15

## 2020-10-15 RX ADMIN — Medication 10 MILLIGRAM(S): at 02:40

## 2020-10-15 RX ADMIN — Medication 25 MICROGRAM(S): at 05:22

## 2020-10-15 RX ADMIN — Medication 650 MILLIGRAM(S): at 12:34

## 2020-10-15 RX ADMIN — HALOPERIDOL DECANOATE 5 MILLIGRAM(S): 100 INJECTION INTRAMUSCULAR at 19:26

## 2020-10-15 RX ADMIN — INSULIN HUMAN 3: 100 INJECTION, SOLUTION SUBCUTANEOUS at 11:45

## 2020-10-15 RX ADMIN — PANTOPRAZOLE SODIUM 40 MILLIGRAM(S): 20 TABLET, DELAYED RELEASE ORAL at 06:55

## 2020-10-15 RX ADMIN — HEPARIN SODIUM 5000 UNIT(S): 5000 INJECTION INTRAVENOUS; SUBCUTANEOUS at 21:42

## 2020-10-15 RX ADMIN — GABAPENTIN 100 MILLIGRAM(S): 400 CAPSULE ORAL at 14:47

## 2020-10-15 RX ADMIN — Medication 650 MILLIGRAM(S): at 07:50

## 2020-10-15 RX ADMIN — HEPARIN SODIUM 5000 UNIT(S): 5000 INJECTION INTRAVENOUS; SUBCUTANEOUS at 13:45

## 2020-10-15 RX ADMIN — Medication 10 MILLIGRAM(S): at 06:47

## 2020-10-15 RX ADMIN — Medication 650 MILLIGRAM(S): at 01:35

## 2020-10-15 RX ADMIN — Medication 650 MILLIGRAM(S): at 23:43

## 2020-10-15 RX ADMIN — INSULIN HUMAN 4: 100 INJECTION, SOLUTION SUBCUTANEOUS at 07:50

## 2020-10-15 RX ADMIN — ATENOLOL 25 MILLIGRAM(S): 25 TABLET ORAL at 05:22

## 2020-10-15 RX ADMIN — HEPARIN SODIUM 5000 UNIT(S): 5000 INJECTION INTRAVENOUS; SUBCUTANEOUS at 05:21

## 2020-10-15 NOTE — CHART NOTE - NSCHARTNOTEFT_GEN_A_CORE
Received a call about pt being confused  Examined the patient      T(C): 36.9 (15 Oct 2020 17:08), Max: 37 (15 Oct 2020 07:43)  T(F): 98.4 (15 Oct 2020 17:08), Max: 98.6 (15 Oct 2020 07:43)  HR: 73 (15 Oct 2020 17:08) (64 - 91)  BP: 139/67 (15 Oct 2020 17:08) (122/56 - 187/91)  BP(mean): 89 (15 Oct 2020 14:15) (12 - 130)  ABP: --  ABP(mean): --  RR: 18 (15 Oct 2020 17:08) (18 - 20)  SpO2: 94% (15 Oct 2020 14:15) (88% - 97%)    Exam:  Neuro: AAOx1  Lungs: clear to auscultation, Normal expansion/effort. 2L NC  Cardiovascular : S1, S2.  Regular rate and rhythm.  Peripheral edema   Cardiac Rhythm: Normal Sinus Rhythm  GI: Abdomen soft, Non-tender, Non-distended.    Extremities: Extremities warm, pink, well-perfused. Pulses:Intact bilaterally.     Patients family member mentioned she was also confused and disoriented last night but was feeling better in the am.

## 2020-10-15 NOTE — CHART NOTE - NSCHARTNOTEFT_GEN_A_CORE
HIEN [facility]  [id number]  89y Female    Indication for ICU admission: sternal fx, bilateral first rib fx T4 fx with disc retropulsion  Admit Date:  10/14  ICU Date:      10/14  OR Date:       N/A    No Known Allergies    PAST MEDICAL & SURGICAL HISTORY:  Obesity  DM (diabetes mellitus)  Peptic ulcer  Hypertension  High blood cholesterol  Perforated chronic gastric ulcer  H/O abdominal hysterectomy  History of cholecystectomy    Home Medications:  acetaminophen-codeine #3: 1 tab(s) orally 2 times a day, As Needed (14 Oct 2020 05:45)  atenolol 25 mg oral tablet: 1 tab(s) orally once a day (14 Oct 2020 05:45)  atorvastatin 10 mg oral tablet: 1 tab(s) orally once a day (14 Oct 2020 05:45)  levothyroxine 25 mcg (0.025 mg) oral tablet: 1 tab(s) orally once a day (14 Oct 2020 05:45)  meloxicam 7.5 mg oral tablet: 1 tab(s) orally once a day, As Needed (14 Oct 2020 05:45)  nystatin 100,000 units/g topical powder: Apply topically to affected area 3 times a day, As Needed (14 Oct 2020 05:45)      Assessment/ Plan  89F presents with sternal fx, rib fracture, T4 Compression fx w/ retropulsion 2/2 fall down 15 steps       NEURO:  Acute pain- Tylenol, Motrin, Oxycodone  T4 Compression fx w/ retropulsion - MR completed, spoke with NSX verbal rec for strict bedrest until [place] brace in place  IMPRESSION:    1.  T4: Severe (80%) acute compression fracture with 5 mm osseous retropulsion producing mild flattening of the ventral spinal cord. No associated spinal cord edema.    2.  T5: 30% acute superior endplate compression fracture without osseous retropulsion.    3.  Additional trace superior endplate compression fractures of T2 and T3 without osseous retropulsion.    4.  Overall mild degenerative changes.      RESP:   Oxygen insufficiency-wean off NC to RA as tolerate   Activity- strict bedrest f/u nsx      CARDS:   acute on chronic HTN overnight likely pain related, received labetalol 10 IVP x2  Troponin negative x 3  Continue Atorvastatin for HLD  Atenolol  Tablet 25 mg for h/o HTN  echo 10/14: EF 67%, G1DD, mild MR/TR, borderline pulm HTN    GI/NUTR:     Regular diet    GI Prophylaxis- PPI    Bowel regimen- holding due to self reported history of chronic diarrhea/incontinence. Will start senna if needed    /RENAL:   Strict I/O- voiding   Na 140, K 4.0, Mag 2.3, Phos 3.0  replete electrolytes as needed  10-15    140  |  109  |  12  ----------------------------<  156<H>  4.0   |  23  |  0.8    Ca    8.4<L>      15 Oct 2020 01:12  Phos  3.0     10-15  Mg     2.3     10-15    TPro  7.3  /  Alb  4.3  /  TBili  0.7  /  DBili  x   /  AST  34  /  ALT  18  /  AlkPhos  103  10-14       HEME/ONC:   DVT prophylaxis- HSQ/ SCDs  Hgb 14.4>13.4>12.5             12.5   7.79  )-----------( 146      ( 15 Oct 2020 01:12 )             38.5     ID:  no active issues  Afebrile  WBC 7.8  COVID neg      ENDO:    Q Ac hs  glucose with ISS    HgA1C 7.1    LINES/DRAINS:  PIV     DISPO:    SICU HIEN [facility]  [id number]  89y Female    Indication for ICU admission: sternal fx, bilateral first rib fx T4 fx with disc retropulsion  Admit Date:  10/14  ICU Date:      10/14  OR Date:       N/A    No Known Allergies    PAST MEDICAL & SURGICAL HISTORY:  Obesity  DM (diabetes mellitus)  Peptic ulcer  Hypertension  High blood cholesterol  Perforated chronic gastric ulcer  H/O abdominal hysterectomy  History of cholecystectomy    Home Medications:  acetaminophen-codeine #3: 1 tab(s) orally 2 times a day, As Needed (14 Oct 2020 05:45)  atenolol 25 mg oral tablet: 1 tab(s) orally once a day (14 Oct 2020 05:45)  atorvastatin 10 mg oral tablet: 1 tab(s) orally once a day (14 Oct 2020 05:45)  levothyroxine 25 mcg (0.025 mg) oral tablet: 1 tab(s) orally once a day (14 Oct 2020 05:45)  meloxicam 7.5 mg oral tablet: 1 tab(s) orally once a day, As Needed (14 Oct 2020 05:45)  nystatin 100,000 units/g topical powder: Apply topically to affected area 3 times a day, As Needed (14 Oct 2020 05:45)      Assessment/ Plan  89F presents with sternal fx, rib fracture, T4 Compression fx w/ retropulsion 2/2 fall down 15 steps       NEURO:  Acute pain- Tylenol, d/c'd oxycodone, was not using and advanced age  T4 Compression fx w/ retropulsion - MR completed, spoke with NSX verbal rec for strict bedrest until [place] brace in place  IMPRESSION:    1.  T4: Severe (80%) acute compression fracture with 5 mm osseous retropulsion producing mild flattening of the ventral spinal cord. No associated spinal cord edema.    2.  T5: 30% acute superior endplate compression fracture without osseous retropulsion.    3.  Additional trace superior endplate compression fractures of T2 and T3 without osseous retropulsion.    4.  Overall mild degenerative changes.      RESP:   hx smoker likely COPD, no home meds  - wean off NC to RA as tolerate      CARDS:   acute on chronic HTN overnight likely pain related, received labetalol 10 IVP x2  Troponin negative x 3  Continue Atorvastatin for HLD  Atenolol  Tablet 25 mg for h/o HTN  echo 10/14: EF 67%, G1DD, mild MR/TR, borderline pulm HTN    GI/NUTR:     Regular diet, tolerating    GI Prophylaxis- PPI    Bowel regimen- holding due to self reported history of chronic diarrhea/incontinence. Will start senna if needed    /RENAL:   Strict I/O- voiding   Na 140, K 4.0, Mag 2.3, Phos 3.0  replete electrolytes as needed  10-15    140  |  109  |  12  ----------------------------<  156<H>  4.0   |  23  |  0.8    Ca    8.4<L>      15 Oct 2020 01:12  Phos  3.0     10-15  Mg     2.3     10-15       HEME/ONC:   DVT prophylaxis- HSQ/ SCDs  Hgb 14.4>13.4>12.5             12.5   7.79  )-----------( 146      ( 15 Oct 2020 01:12 )             38.5     ID:  no active issues  Afebrile  WBC 7.8  COVID neg      ENDO:    Q Ac hs  glucose with ISS    HgA1C 7.1%    LINES/DRAINS:  PIV     MSK:  Activity- strict bedrest until back brace in place    DISPO:  downgrade to floor        FOLLOW UP:  - [place] brace, MUST REMAIN STRICT BEDREST UNTIL BRACE IN PLACE  - 0530 labs  -PT/OT/SW consult     signed out to *** HIEN [facility]  [id number]  89y Female    Indication for ICU admission: sternal fx, bilateral first rib fx T4 fx with disc retropulsion  Admit Date:  10/14  ICU Date:      10/14  OR Date:       N/A    No Known Allergies    PAST MEDICAL & SURGICAL HISTORY:  Obesity  DM (diabetes mellitus)  Peptic ulcer  Hypertension  High blood cholesterol  Perforated chronic gastric ulcer  H/O abdominal hysterectomy  History of cholecystectomy    Home Medications:  acetaminophen-codeine #3: 1 tab(s) orally 2 times a day, As Needed (14 Oct 2020 05:45)  atenolol 25 mg oral tablet: 1 tab(s) orally once a day (14 Oct 2020 05:45)  atorvastatin 10 mg oral tablet: 1 tab(s) orally once a day (14 Oct 2020 05:45)  levothyroxine 25 mcg (0.025 mg) oral tablet: 1 tab(s) orally once a day (14 Oct 2020 05:45)  meloxicam 7.5 mg oral tablet: 1 tab(s) orally once a day, As Needed (14 Oct 2020 05:45)  nystatin 100,000 units/g topical powder: Apply topically to affected area 3 times a day, As Needed (14 Oct 2020 05:45)      Assessment/ Plan  89F presents with sternal fx, rib fracture, T4 Compression fx w/ retropulsion 2/2 fall down 15 steps       NEURO:  Acute pain- Tylenol, d/c'd oxycodone, was not using and advanced age  T4 Compression fx w/ retropulsion - MR completed, spoke with NSX verbal rec for strict bedrest until [place] brace in place  IMPRESSION:    1.  T4: Severe (80%) acute compression fracture with 5 mm osseous retropulsion producing mild flattening of the ventral spinal cord. No associated spinal cord edema.    2.  T5: 30% acute superior endplate compression fracture without osseous retropulsion.    3.  Additional trace superior endplate compression fractures of T2 and T3 without osseous retropulsion.    4.  Overall mild degenerative changes.      RESP:   hx smoker likely COPD, no home meds  - wean off NC to RA as tolerate      CARDS:   acute on chronic HTN overnight likely pain related, received labetalol 10 IVP x2  Troponin negative x 3  Continue Atorvastatin for HLD  Atenolol  Tablet 25 mg for h/o HTN  echo 10/14: EF 67%, G1DD, mild MR/TR, borderline pulm HTN    GI/NUTR:     Regular diet, tolerating    GI Prophylaxis- PPI    Bowel regimen- holding due to self reported history of chronic diarrhea/incontinence. Will start senna if needed    /RENAL:   Strict I/O- voiding   Na 140, K 4.0, Mag 2.3, Phos 3.0  replete electrolytes as needed  10-15    140  |  109  |  12  ----------------------------<  156<H>  4.0   |  23  |  0.8    Ca    8.4<L>      15 Oct 2020 01:12  Phos  3.0     10-15  Mg     2.3     10-15       HEME/ONC:   DVT prophylaxis- HSQ/ SCDs  Hgb 14.4>13.4>12.5             12.5   7.79  )-----------( 146      ( 15 Oct 2020 01:12 )             38.5     ID:  no active issues  Afebrile  WBC 7.8  COVID neg      ENDO:    Q Ac hs  glucose with ISS    HgA1C 7.1%    LINES/DRAINS:  PIV     MSK:  Activity- strict bedrest until back brace in place  -R knee pain - XR completed, f/u read    DISPO:  downgrade to floor        FOLLOW UP:  - [place] brace, MUST REMAIN STRICT BEDREST UNTIL BRACE IN PLACE  - 2330 labs  - R knee XR reads  - PT/OT/SW consult     signed out to *** HIEN [facility]  [id number]  89y Female    Indication for ICU admission: sternal fx, bilateral first rib fx, T4 compression fx with disc retropulsion  Admit Date:  10/14  ICU Date:      10/14  OR Date:       N/A    No Known Allergies    PAST MEDICAL & SURGICAL HISTORY:  Obesity  DM (diabetes mellitus)  Peptic ulcer  Hypertension  High blood cholesterol  Perforated chronic gastric ulcer  H/O abdominal hysterectomy  History of cholecystectomy    Home Medications:  acetaminophen-codeine #3: 1 tab(s) orally 2 times a day, As Needed (14 Oct 2020 05:45)  atenolol 25 mg oral tablet: 1 tab(s) orally once a day (14 Oct 2020 05:45)  atorvastatin 10 mg oral tablet: 1 tab(s) orally once a day (14 Oct 2020 05:45)  levothyroxine 25 mcg (0.025 mg) oral tablet: 1 tab(s) orally once a day (14 Oct 2020 05:45)  meloxicam 7.5 mg oral tablet: 1 tab(s) orally once a day, As Needed (14 Oct 2020 05:45)  nystatin 100,000 units/g topical powder: Apply topically to affected area 3 times a day, As Needed (14 Oct 2020 05:45)      Assessment/ Plan  89F presents with sternal fx, rib fracture, T4 Compression fx w/ retropulsion 2/2 fall down 15 steps     NEURO:  Acute pain- Tylenol, d/c'd oxycodone, was not using and advanced age  T4 Compression fx w/ retropulsion - MR completed, spoke with FE verbal rec for strict bedrest until [place] brace in place - spoke with Dr Naidu bedside. Patient ok to get out of bed without brace if patient cannot tolerate it. Will try [place] brace if not able to tolerate plan to try TLSO brace or soft binder for comfort while out of bed.   IMPRESSION:    1.  T4: Severe (80%) acute compression fracture with 5 mm osseous retropulsion producing mild flattening of the ventral spinal cord. No associated spinal cord edema.    2.  T5: 30% acute superior endplate compression fracture without osseous retropulsion.    3.  Additional trace superior endplate compression fractures of T2 and T3 without osseous retropulsion.    4.  Overall mild degenerative changes.      RESP:   b/l 1st rib fractures, pulling IS at 750cc  hx smoker likely COPD, no home meds  - wean off NC to RA as tolerate      CARDS:   acute on chronic HTN overnight likely pain related, received labetalol 10 IVP x2  Troponin negative x 3  Continue Atorvastatin for HLD  Atenolol  Tablet 25 mg for h/o HTN  echo 10/14: EF 67%, G1DD, mild MR/TR, borderline pulm HTN    GI/NUTR:     Regular diet, tolerating    GI Prophylaxis- PPI    Bowel regimen- holding due to self reported history of chronic diarrhea/incontinence. Will start senna if needed    /RENAL:   Strict I/O- voiding   Na 140, K 4.0, Mag 2.3, Phos 3.0  replete electrolytes as needed  10-15    140  |  109  |  12  ----------------------------<  156<H>  4.0   |  23  |  0.8    Ca    8.4<L>      15 Oct 2020 01:12  Phos  3.0     10-15  Mg     2.3     10-15       HEME/ONC:   DVT prophylaxis- HSQ/ SCDs  Hgb 14.4>13.4>12.5             12.5   7.79  )-----------( 146      ( 15 Oct 2020 01:12 )             38.5     ID:  no active issues  Afebrile  WBC 7.8  COVID neg      ENDO:    Q Ac hs  glucose with ISS    HgA1C 7.1%    LINES/DRAINS:  PIV     MSK:  Activity- strict bedrest until back brace in place  -R knee pain - XR completed, f/u read    DISPO:  downgrade to floor        FOLLOW UP:  - [place] brace or TLSO brace for comfort  - 2330 labs  - R knee XR reads  - PT/OT/SW consult     signed out to Dr Guillermo Negrete  time: [id number]  date: 10/15/2020

## 2020-10-15 NOTE — PROGRESS NOTE ADULT - ATTENDING COMMENTS
I examined the patient with the PA/resident and discussed my plan with the Resident/PA.  I personally provided over 30 minutes of direct critical care to this patient. I agree with the above resident/pa note unless directly contradicted below.     HIEN Johnson I examined the patient with the PA/resident and discussed my plan with the Resident/PA.  I personally provided over 30 minutes of direct critical care to this patient. I agree with the above resident/pa note unless directly contradicted below.     HIEN CONNELLAFF 89y s/p fall t4 fx     FEeling better this morning. Pain controlled. Neurosensory exam intact. POLLACK.       t4 fx s/p fall  -MRI Severe (80%) acute compression fracture with 5 mm osseous retropulsion producing mild flattening of the ventral spinal cord.   -Neurosx rec- no surgical intervention, bedrest until [place] brace then may ambulate

## 2020-10-15 NOTE — CHART NOTE - NSCHARTNOTEFT_GEN_A_CORE
MRI reviewed with Attending. < from: MR Thoracic Spine No Cont (10.14.20 @ 20:54) >    1.  T4: Severe (80%) acute compression fracture with 5 mm osseous retropulsion producing mild flattening of the ventral spinal cord. No associated spinal cord edema.    2.  T5: 30% acute superior endplate compression fracture without osseous retropulsion.    3.  Additional trace superior endplate compression fractures of T2 and T3 without osseous retropulsion.    4.  Overall mild degenerative changes.    < end of copied text >    No Neurosurgical intervention at this time. Recommend [place] brace.

## 2020-10-15 NOTE — PROGRESS NOTE ADULT - ASSESSMENT
Assessment/ Plan  89F presents with sternal fx, rib fracture, T4 Compression fx w/ retropulsion 2/2 fall down 15 steps       NEURO:  Acute pain- Tylenol, Motrin, Oxycodone    RESP:   Oxygen insufficiency-wean off NC to RA as tolerate   Activity- strict bedrest      CARDS:   EKG Reviewed  Troponin negative x 3  MAP > 65  Continue Atorvastatin for HLD  Atenolol  Tablet 25 mg for h/o HTN  echo 10/14: EF 67%, G1DD, mild MR/TR, borderline pulm HTN    GI/NUTR:     Regular diet    GI Prophylaxis- PPI    Bowel regimen- holding due to self reported history of chronic diarrhea/incontinence. Will start senna if needed    /RENAL:   Strict I/O-  Na 140, K 4.0, Mag 2.3, Phos 3.0  replete electrolytes as needed     HEME/ONC:   DVT prophylaxis- HSQ/ SCDs  Hgb 14.4>13.4>12.5    ID:  no active issues  Afebrile  WBC 7.8  COVID neg      ENDO:    Q Ac hs  glucose with ISS    HgA1C 7.1    LINES/DRAINS:  PIV     DISPO:    SICU                           Assessment/ Plan  89F presents with sternal fx, rib fracture, T4 Compression fx w/ retropulsion 2/2 fall down 15 steps       NEURO:  Acute pain- Tylenol, Motrin, Oxycodone  T4 Compression fx w/ retropulsion - MR completed, pending read and NSX recs      RESP:   Oxygen insufficiency-wean off NC to RA as tolerate   Activity- strict bedrest f/u nsx      CARDS:   acute on chronic HTN overnight likely pain related, received labetalol 10 IVP x2  Troponin negative x 3  Continue Atorvastatin for HLD  Atenolol  Tablet 25 mg for h/o HTN  echo 10/14: EF 67%, G1DD, mild MR/TR, borderline pulm HTN    GI/NUTR:     Regular diet    GI Prophylaxis- PPI    Bowel regimen- holding due to self reported history of chronic diarrhea/incontinence. Will start senna if needed    /RENAL:   Strict I/O-  Na 140, K 4.0, Mag 2.3, Phos 3.0  replete electrolytes as needed  10-15    140  |  109  |  12  ----------------------------<  156<H>  4.0   |  23  |  0.8    Ca    8.4<L>      15 Oct 2020 01:12  Phos  3.0     10-15  Mg     2.3     10-15    TPro  7.3  /  Alb  4.3  /  TBili  0.7  /  DBili  x   /  AST  34  /  ALT  18  /  AlkPhos  103  10-14       HEME/ONC:   DVT prophylaxis- HSQ/ SCDs  Hgb 14.4>13.4>12.5             12.5   7.79  )-----------( 146      ( 15 Oct 2020 01:12 )             38.5     ID:  no active issues  Afebrile  WBC 7.8  COVID neg      ENDO:    Q Ac hs  glucose with ISS    HgA1C 7.1    LINES/DRAINS:  PIV     DISPO:    SICU                           Assessment/ Plan  89F presents with sternal fx, rib fracture, T4 Compression fx w/ retropulsion 2/2 fall down 15 steps       NEURO:  Acute pain- Tylenol, Motrin, Oxycodone  T4 Compression fx w/ retropulsion - MR completed, pending read and NSX recs    RESP:   Oxygen insufficiency-wean off NC to RA as tolerate   Activity- strict bedrest f/u nsx      CARDS:   acute on chronic HTN overnight likely pain related, received labetalol 10 IVP x2  Troponin negative x 3  Continue Atorvastatin for HLD  Atenolol  Tablet 25 mg for h/o HTN  echo 10/14: EF 67%, G1DD, mild MR/TR, borderline pulm HTN    GI/NUTR:     Regular diet    GI Prophylaxis- PPI    Bowel regimen- holding due to self reported history of chronic diarrhea/incontinence. Will start senna if needed    /RENAL:   Strict I/O- voiding   Na 140, K 4.0, Mag 2.3, Phos 3.0  replete electrolytes as needed  10-15    140  |  109  |  12  ----------------------------<  156<H>  4.0   |  23  |  0.8    Ca    8.4<L>      15 Oct 2020 01:12  Phos  3.0     10-15  Mg     2.3     10-15    TPro  7.3  /  Alb  4.3  /  TBili  0.7  /  DBili  x   /  AST  34  /  ALT  18  /  AlkPhos  103  10-14       HEME/ONC:   DVT prophylaxis- HSQ/ SCDs  Hgb 14.4>13.4>12.5             12.5   7.79  )-----------( 146      ( 15 Oct 2020 01:12 )             38.5     ID:  no active issues  Afebrile  WBC 7.8  COVID neg      ENDO:    Q Ac hs  glucose with ISS    HgA1C 7.1    LINES/DRAINS:  PIV     DISPO:    SICU                           Assessment/ Plan  89F presents with sternal fx, rib fracture, T4 Compression fx w/ retropulsion 2/2 fall down 15 steps       NEURO:  Acute pain- Tylenol, Motrin, Oxycodone  T4 Compression fx w/ retropulsion - MR completed, spoke with NSX verbal rec for strict bedrest until [place] brace in place  IMPRESSION:    1.  T4: Severe (80%) acute compression fracture with 5 mm osseous retropulsion producing mild flattening of the ventral spinal cord. No associated spinal cord edema.    2.  T5: 30% acute superior endplate compression fracture without osseous retropulsion.    3.  Additional trace superior endplate compression fractures of T2 and T3 without osseous retropulsion.    4.  Overall mild degenerative changes.      RESP:   Oxygen insufficiency-wean off NC to RA as tolerate   Activity- strict bedrest f/u nsx      CARDS:   acute on chronic HTN overnight likely pain related, received labetalol 10 IVP x2  Troponin negative x 3  Continue Atorvastatin for HLD  Atenolol  Tablet 25 mg for h/o HTN  echo 10/14: EF 67%, G1DD, mild MR/TR, borderline pulm HTN    GI/NUTR:     Regular diet    GI Prophylaxis- PPI    Bowel regimen- holding due to self reported history of chronic diarrhea/incontinence. Will start senna if needed    /RENAL:   Strict I/O- voiding   Na 140, K 4.0, Mag 2.3, Phos 3.0  replete electrolytes as needed  10-15    140  |  109  |  12  ----------------------------<  156<H>  4.0   |  23  |  0.8    Ca    8.4<L>      15 Oct 2020 01:12  Phos  3.0     10-15  Mg     2.3     10-15    TPro  7.3  /  Alb  4.3  /  TBili  0.7  /  DBili  x   /  AST  34  /  ALT  18  /  AlkPhos  103  10-14       HEME/ONC:   DVT prophylaxis- HSQ/ SCDs  Hgb 14.4>13.4>12.5             12.5   7.79  )-----------( 146      ( 15 Oct 2020 01:12 )             38.5     ID:  no active issues  Afebrile  WBC 7.8  COVID neg      ENDO:    Q Ac hs  glucose with ISS    HgA1C 7.1    LINES/DRAINS:  PIV     DISPO:    SICU                           Assessment/ Plan  89F presents with sternal fx, rib fracture, T4 Compression fx w/ retropulsion 2/2 fall down 15 steps       NEURO:  Acute pain- Tylenol, Motrin, Oxycodone  T4 Compression fx w/ retropulsion - MR completed, spoke with NSX verbal rec for strict bedrest until [place] brace in place, no need for further neurochecks  IMPRESSION:    1.  T4: Severe (80%) acute compression fracture with 5 mm osseous retropulsion producing mild flattening of the ventral spinal cord. No associated spinal cord edema.    2.  T5: 30% acute superior endplate compression fracture without osseous retropulsion.    3.  Additional trace superior endplate compression fractures of T2 and T3 without osseous retropulsion.    4.  Overall mild degenerative changes.      RESP:   Oxygen insufficiency-wean off NC to RA as tolerate   Activity- strict bedrest f/u nsx      CARDS:   acute on chronic HTN overnight likely pain related, received labetalol 10 IVP x2  Troponin negative x 3  Continue Atorvastatin for HLD  Atenolol  Tablet 25 mg for h/o HTN  echo 10/14: EF 67%, G1DD, mild MR/TR, borderline pulm HTN    GI/NUTR:     Regular diet    GI Prophylaxis- PPI    Bowel regimen- holding due to self reported history of chronic diarrhea/incontinence. Will start senna if needed    /RENAL:   Strict I/O- voiding   Na 140, K 4.0, Mag 2.3, Phos 3.0  replete electrolytes as needed  10-15    140  |  109  |  12  ----------------------------<  156<H>  4.0   |  23  |  0.8    Ca    8.4<L>      15 Oct 2020 01:12  Phos  3.0     10-15  Mg     2.3     10-15    TPro  7.3  /  Alb  4.3  /  TBili  0.7  /  DBili  x   /  AST  34  /  ALT  18  /  AlkPhos  103  10-14       HEME/ONC:   DVT prophylaxis- HSQ/ SCDs  Hgb 14.4>13.4>12.5             12.5   7.79  )-----------( 146      ( 15 Oct 2020 01:12 )             38.5     ID:  no active issues  Afebrile  WBC 7.8  COVID neg      ENDO:    Q Ac hs  glucose with ISS    HgA1C 7.1    LINES/DRAINS:  PIV     DISPO:  downgrade to floor

## 2020-10-15 NOTE — PROGRESS NOTE ADULT - SUBJECTIVE AND OBJECTIVE BOX
[facility]  [id number]  89y Female    Indication for ICU admission: sternal fx, bilateral first rib fx T4 fx with disc retropulsion  Admit Date:  10/14  ICU Date:      10/14  OR Date:       N/A    No Known Allergies    PAST MEDICAL & SURGICAL HISTORY:  Obesity  DM (diabetes mellitus)  Peptic ulcer  Hypertension  High blood cholesterol  Perforated chronic gastric ulcer  H/O abdominal hysterectomy  History of cholecystectomy    Home Medications:  acetaminophen-codeine #3: 1 tab(s) orally 2 times a day, As Needed (14 Oct 2020 05:45)  atenolol 25 mg oral tablet: 1 tab(s) orally once a day (14 Oct 2020 05:45)  atorvastatin 10 mg oral tablet: 1 tab(s) orally once a day (14 Oct 2020 05:45)  levothyroxine 25 mcg (0.025 mg) oral tablet: 1 tab(s) orally once a day (14 Oct 2020 05:45)  meloxicam 7.5 mg oral tablet: 1 tab(s) orally once a day, As Needed (14 Oct 2020 05:45)  nystatin 100,000 units/g topical powder: Apply topically to affected area 3 times a day, As Needed (14 Oct 2020 05:45)      24HRS EVENT:  Seen by NSX- [place] brace ordered, MRI pending- may need surgical intervention to stabilize  CE neg x 3  echo 10/14: EF 67%, G1DD, mild MR/TR, borderline pulm HTN  A1C 7.1    O/N: HTN to 190s, gave labetalol 10 x1 with improvement to 140s  fitted for [place] brace      DVT PTX: HSQ    GI PTX:     Protonix    ***Tubes/Lines/Drains  ***  Peripheral IV    REVIEW OF SYSTEMS    [x] A ten-point review of systems was otherwise negative except as noted.  [ ] Due to altered mental status/intubation, subjective information were not able to be obtained from the patient. History was obtained, to the extent possible, from review of the chart and collateral sources of information.      Daily     Daily     Diet, Consistent Carbohydrate/No Snacks (10-14-20 @ 18:06)      CURRENT MEDS:  Neurologic Medications  acetaminophen   Tablet .. 650 milliGRAM(s) Oral every 6 hours  oxyCODONE    IR 5 milliGRAM(s) Oral every 6 hours PRN Moderate Pain (4 - 6)    Respiratory Medications    Cardiovascular Medications  ATENolol  Tablet 25 milliGRAM(s) Oral daily    Gastrointestinal Medications  pantoprazole    Tablet 40 milliGRAM(s) Oral before breakfast    Genitourinary Medications    Hematologic/Oncologic Medications  heparin   Injectable 5000 Unit(s) SubCutaneous every 8 hours    Antimicrobial/Immunologic Medications    Endocrine/Metabolic Medications  atorvastatin Oral Tab/Cap - Peds 10 milliGRAM(s) Oral daily  dextrose 50% Injectable 25 Gram(s) IV Push once  insulin regular  human corrective regimen sliding scale   IV Push Before meals and at bedtime  levothyroxine 25 MICROGram(s) Oral daily    Topical/Other Medications      ICU Vital Signs Last 24 Hrs  T(C): 36.6 (14 Oct 2020 14:00), Max: 36.6 (14 Oct 2020 14:00)  T(F): 97.8 (14 Oct 2020 14:00), Max: 97.8 (14 Oct 2020 14:00)  HR: 68 (15 Oct 2020 04:00) (60 - 75)  BP: 141/62 (15 Oct 2020 04:00) (123/60 - 165/80)  BP(mean): 89 (15 Oct 2020 04:00) (81 - 120)  ABP: --  ABP(mean): --  RR: 20 (14 Oct 2020 07:25) (20 - 20)  SpO2: 92% (15 Oct 2020 04:00) (92% - 100%)      Adult Advanced Hemodynamics Last 24 Hrs  CVP(mm Hg): --  CVP(cm H2O): --  CO: --  CI: --  PA: --  PA(mean): --  PCWP: --  SVR: --  SVRI: --  PVR: --  PVRI: --          I&O's Summary    14 Oct 2020 07:01  -  15 Oct 2020 04:43  --------------------------------------------------------  IN: 875 mL / OUT: 300 mL / NET: 575 mL      I&O's Detail    14 Oct 2020 07:01  -  15 Oct 2020 04:43  --------------------------------------------------------  IN:    IV PiggyBack: 50 mL    Lactated Ringers: 825 mL  Total IN: 875 mL    OUT:    Voided (mL): 300 mL  Total OUT: 300 mL    Total NET: 575 mL          PHYSICAL EXAM:    General/Neuro  alert & oriented x 3, no focal deficits  CN II-XII intact    Lungs:      clear to auscultation, Normal expansion/effort.     Cardiovascular : S1, S2.  Regular rate and rhythm.  Peripheral edema   Cardiac Rhythm: Normal Sinus Rhythm    GI: Abdomen soft, Non-tender, Non-distended.      Extremities: Extremities warm, pink, well-perfused. Pulses:Intact bilaterally.     Derm: Good skin turgor, no skin breakdown.      :       Dean catheter in place.    MSK: Mild ttp over right patella, patient able to fully range all extremities.       CXR:       LABS:  CAPILLARY BLOOD GLUCOSE      POCT Blood Glucose.: 126 mg/dL (14 Oct 2020 21:33)  POCT Blood Glucose.: 108 mg/dL (14 Oct 2020 17:47)  POCT Blood Glucose.: 135 mg/dL (14 Oct 2020 14:22)                          12.5   7.79  )-----------( 146      ( 15 Oct 2020 01:12 )             38.5       10-15    140  |  109  |  12  ----------------------------<  156<H>  4.0   |  23  |  0.8    Ca    8.4<L>      15 Oct 2020 01:12  Phos  3.0     10-15  Mg     2.3     10-15    TPro  7.3  /  Alb  4.3  /  TBili  0.7  /  DBili  x   /  AST  34  /  ALT  18  /  AlkPhos  103  10-14      PT/INR - ( 15 Oct 2020 01:12 )   PT: 11.30 sec;   INR: 0.98 ratio         PTT - ( 15 Oct 2020 01:12 )  PTT:28.2 sec  CARDIAC MARKERS ( 14 Oct 2020 11:00 )  x     / <0.01 ng/mL / x     / x     / x      CARDIAC MARKERS ( 14 Oct 2020 05:40 )  x     / <0.01 ng/mL / x     / x     / x      CARDIAC MARKERS ( 14 Oct 2020 03:40 )  x     / <0.01 ng/mL / x     / x     / x      CARDIAC MARKERS ( 14 Oct 2020 02:02 )  x     / x     / 137 U/L / x     / x               [facility]  [id number]  89y Female    Indication for ICU admission: sternal fx, bilateral first rib fx T4 fx with disc retropulsion  Admit Date:  10/14  ICU Date:      10/14  OR Date:       N/A    No Known Allergies    PAST MEDICAL & SURGICAL HISTORY:  Obesity  DM (diabetes mellitus)  Peptic ulcer  Hypertension  High blood cholesterol  Perforated chronic gastric ulcer  H/O abdominal hysterectomy  History of cholecystectomy    Home Medications:  acetaminophen-codeine #3: 1 tab(s) orally 2 times a day, As Needed (14 Oct 2020 05:45)  atenolol 25 mg oral tablet: 1 tab(s) orally once a day (14 Oct 2020 05:45)  atorvastatin 10 mg oral tablet: 1 tab(s) orally once a day (14 Oct 2020 05:45)  levothyroxine 25 mcg (0.025 mg) oral tablet: 1 tab(s) orally once a day (14 Oct 2020 05:45)  meloxicam 7.5 mg oral tablet: 1 tab(s) orally once a day, As Needed (14 Oct 2020 05:45)  nystatin 100,000 units/g topical powder: Apply topically to affected area 3 times a day, As Needed (14 Oct 2020 05:45)      24HRS EVENT:  Seen by NSX- [place] brace ordered, MRI complete - pending read  CE neg x 3  echo 10/14: EF 67%, G1DD, mild MR/TR, borderline pulm HTN  A1C 7.1    O/N: HTN to 190s, gave labetalol 10 x1 with improvement to 140s  fitted for [place] brace      DVT PTX: HSQ    GI PTX:     Protonix    ***Tubes/Lines/Drains  ***  Peripheral IV    REVIEW OF SYSTEMS    [x] A ten-point review of systems was otherwise negative except as noted.  [ ] Due to altered mental status/intubation, subjective information were not able to be obtained from the patient. History was obtained, to the extent possible, from review of the chart and collateral sources of information.      Daily     Daily     Diet, Consistent Carbohydrate/No Snacks (10-14-20 @ 18:06)      CURRENT MEDS:  Neurologic Medications  acetaminophen   Tablet .. 650 milliGRAM(s) Oral every 6 hours  oxyCODONE    IR 5 milliGRAM(s) Oral every 6 hours PRN Moderate Pain (4 - 6)    Respiratory Medications    Cardiovascular Medications  ATENolol  Tablet 25 milliGRAM(s) Oral daily    Gastrointestinal Medications  pantoprazole    Tablet 40 milliGRAM(s) Oral before breakfast    Genitourinary Medications    Hematologic/Oncologic Medications  heparin   Injectable 5000 Unit(s) SubCutaneous every 8 hours    Antimicrobial/Immunologic Medications    Endocrine/Metabolic Medications  atorvastatin Oral Tab/Cap - Peds 10 milliGRAM(s) Oral daily  dextrose 50% Injectable 25 Gram(s) IV Push once  insulin regular  human corrective regimen sliding scale   IV Push Before meals and at bedtime  levothyroxine 25 MICROGram(s) Oral daily    Topical/Other Medications      ICU Vital Signs Last 24 Hrs  T(C): 36.6 (14 Oct 2020 14:00), Max: 36.6 (14 Oct 2020 14:00)  T(F): 97.8 (14 Oct 2020 14:00), Max: 97.8 (14 Oct 2020 14:00)  HR: 68 (15 Oct 2020 04:00) (60 - 75)  BP: 141/62 (15 Oct 2020 04:00) (123/60 - 165/80)  BP(mean): 89 (15 Oct 2020 04:00) (81 - 120)  ABP: --  ABP(mean): --  RR: 20 (14 Oct 2020 07:25) (20 - 20)  SpO2: 92% (15 Oct 2020 04:00) (92% - 100%)      Adult Advanced Hemodynamics Last 24 Hrs  CVP(mm Hg): --  CVP(cm H2O): --  CO: --  CI: --  PA: --  PA(mean): --  PCWP: --  SVR: --  SVRI: --  PVR: --  PVRI: --          I&O's Summary    14 Oct 2020 07:01  -  15 Oct 2020 04:43  --------------------------------------------------------  IN: 875 mL / OUT: 300 mL / NET: 575 mL      I&O's Detail    14 Oct 2020 07:01  -  15 Oct 2020 04:43  --------------------------------------------------------  IN:    IV PiggyBack: 50 mL    Lactated Ringers: 825 mL  Total IN: 875 mL    OUT:    Voided (mL): 300 mL  Total OUT: 300 mL    Total NET: 575 mL          PHYSICAL EXAM:    General/Neuro  alert & oriented x 3, no focal deficits  CN II-XII intact    Lungs:      clear to auscultation, Normal expansion/effort.     Cardiovascular : S1, S2.  Regular rate and rhythm.  Peripheral edema   Cardiac Rhythm: Normal Sinus Rhythm    GI: Abdomen soft, Non-tender, Non-distended.      Extremities: Extremities warm, pink, well-perfused. Pulses:Intact bilaterally.     Derm: Good skin turgor, no skin breakdown.      :       Dean catheter in place.    MSK: Mild ttp over right patella, patient able to fully range all extremities.       CXR:       LABS:  CAPILLARY BLOOD GLUCOSE      POCT Blood Glucose.: 126 mg/dL (14 Oct 2020 21:33)  POCT Blood Glucose.: 108 mg/dL (14 Oct 2020 17:47)  POCT Blood Glucose.: 135 mg/dL (14 Oct 2020 14:22)                          12.5   7.79  )-----------( 146      ( 15 Oct 2020 01:12 )             38.5       10-15    140  |  109  |  12  ----------------------------<  156<H>  4.0   |  23  |  0.8    Ca    8.4<L>      15 Oct 2020 01:12  Phos  3.0     10-15  Mg     2.3     10-15    TPro  7.3  /  Alb  4.3  /  TBili  0.7  /  DBili  x   /  AST  34  /  ALT  18  /  AlkPhos  103  10-14      PT/INR - ( 15 Oct 2020 01:12 )   PT: 11.30 sec;   INR: 0.98 ratio         PTT - ( 15 Oct 2020 01:12 )  PTT:28.2 sec  CARDIAC MARKERS ( 14 Oct 2020 11:00 )  x     / <0.01 ng/mL / x     / x     / x      CARDIAC MARKERS ( 14 Oct 2020 05:40 )  x     / <0.01 ng/mL / x     / x     / x      CARDIAC MARKERS ( 14 Oct 2020 03:40 )  x     / <0.01 ng/mL / x     / x     / x      CARDIAC MARKERS ( 14 Oct 2020 02:02 )  x     / x     / 137 U/L / x     / x               [facility]  [id number]  89y Female    Indication for ICU admission: sternal fx, bilateral first rib fx T4 fx with disc retropulsion  Admit Date:  10/14  ICU Date:      10/14  OR Date:       N/A    No Known Allergies    PAST MEDICAL & SURGICAL HISTORY:  Obesity  DM (diabetes mellitus)  Peptic ulcer  Hypertension  High blood cholesterol  Perforated chronic gastric ulcer  H/O abdominal hysterectomy  History of cholecystectomy    Home Medications:  acetaminophen-codeine #3: 1 tab(s) orally 2 times a day, As Needed (14 Oct 2020 05:45)  atenolol 25 mg oral tablet: 1 tab(s) orally once a day (14 Oct 2020 05:45)  atorvastatin 10 mg oral tablet: 1 tab(s) orally once a day (14 Oct 2020 05:45)  levothyroxine 25 mcg (0.025 mg) oral tablet: 1 tab(s) orally once a day (14 Oct 2020 05:45)  meloxicam 7.5 mg oral tablet: 1 tab(s) orally once a day, As Needed (14 Oct 2020 05:45)  nystatin 100,000 units/g topical powder: Apply topically to affected area 3 times a day, As Needed (14 Oct 2020 05:45)      24HRS EVENT:  Seen by NSX- [place] brace ordered, MRI complete - pending read  CE neg x 3  echo 10/14: EF 67%, G1DD, mild MR/TR, borderline pulm HTN  A1C 7.1    O/N: HTN to 190s, gave labetalol 10 x1 with improvement to 140s  fitted for [place] brace      DVT PTX: HSQ    GI PTX:     Protonix    ***Tubes/Lines/Drains  ***  Peripheral IV    REVIEW OF SYSTEMS    [x] A ten-point review of systems was otherwise negative except as noted.  [ ] Due to altered mental status/intubation, subjective information were not able to be obtained from the patient. History was obtained, to the extent possible, from review of the chart and collateral sources of information.      Daily     Daily     Diet, Consistent Carbohydrate/No Snacks (10-14-20 @ 18:06)      CURRENT MEDS:  Neurologic Medications  acetaminophen   Tablet .. 650 milliGRAM(s) Oral every 6 hours  oxyCODONE    IR 5 milliGRAM(s) Oral every 6 hours PRN Moderate Pain (4 - 6)    Respiratory Medications    Cardiovascular Medications  ATENolol  Tablet 25 milliGRAM(s) Oral daily    Gastrointestinal Medications  pantoprazole    Tablet 40 milliGRAM(s) Oral before breakfast    Genitourinary Medications    Hematologic/Oncologic Medications  heparin   Injectable 5000 Unit(s) SubCutaneous every 8 hours    Antimicrobial/Immunologic Medications    Endocrine/Metabolic Medications  atorvastatin Oral Tab/Cap - Peds 10 milliGRAM(s) Oral daily  dextrose 50% Injectable 25 Gram(s) IV Push once  insulin regular  human corrective regimen sliding scale   IV Push Before meals and at bedtime  levothyroxine 25 MICROGram(s) Oral daily    Topical/Other Medications      ICU Vital Signs Last 24 Hrs  T(C): 36.6 (14 Oct 2020 14:00), Max: 36.6 (14 Oct 2020 14:00)  T(F): 97.8 (14 Oct 2020 14:00), Max: 97.8 (14 Oct 2020 14:00)  HR: 68 (15 Oct 2020 04:00) (60 - 75)  BP: 141/62 (15 Oct 2020 04:00) (123/60 - 165/80)  BP(mean): 89 (15 Oct 2020 04:00) (81 - 120)  ABP: --  ABP(mean): --  RR: 20 (14 Oct 2020 07:25) (20 - 20)  SpO2: 92% (15 Oct 2020 04:00) (92% - 100%)      Adult Advanced Hemodynamics Last 24 Hrs  CVP(mm Hg): --  CVP(cm H2O): --  CO: --  CI: --  PA: --  PA(mean): --  PCWP: --  SVR: --  SVRI: --  PVR: --  PVRI: --          I&O's Summary    14 Oct 2020 07:01  -  15 Oct 2020 04:43  --------------------------------------------------------  IN: 875 mL / OUT: 300 mL / NET: 575 mL      I&O's Detail    14 Oct 2020 07:01  -  15 Oct 2020 04:43  --------------------------------------------------------  IN:    IV PiggyBack: 50 mL    Lactated Ringers: 825 mL  Total IN: 875 mL    OUT:    Voided (mL): 300 mL  Total OUT: 300 mL    Total NET: 575 mL          PHYSICAL EXAM:    General/Neuro  alert & oriented x 3, no focal deficits  CN II-XII intact    Lungs:      clear to auscultation, Normal expansion/effort.   2L NC    Cardiovascular : S1, S2.  Regular rate and rhythm.  Peripheral edema   Cardiac Rhythm: Normal Sinus Rhythm    GI: Abdomen soft, Non-tender, Non-distended.      Extremities: Extremities warm, pink, well-perfused. Pulses:Intact bilaterally.     Derm: Good skin turgor, no skin breakdown.      :    voiding     MSK: Mild ttp over right patella, patient able to fully range all extremities.       CXR:       LABS:  CAPILLARY BLOOD GLUCOSE      POCT Blood Glucose.: 126 mg/dL (14 Oct 2020 21:33)  POCT Blood Glucose.: 108 mg/dL (14 Oct 2020 17:47)  POCT Blood Glucose.: 135 mg/dL (14 Oct 2020 14:22)                          12.5   7.79  )-----------( 146      ( 15 Oct 2020 01:12 )             38.5       10-15    140  |  109  |  12  ----------------------------<  156<H>  4.0   |  23  |  0.8    Ca    8.4<L>      15 Oct 2020 01:12  Phos  3.0     10-15  Mg     2.3     10-15    TPro  7.3  /  Alb  4.3  /  TBili  0.7  /  DBili  x   /  AST  34  /  ALT  18  /  AlkPhos  103  10-14      PT/INR - ( 15 Oct 2020 01:12 )   PT: 11.30 sec;   INR: 0.98 ratio         PTT - ( 15 Oct 2020 01:12 )  PTT:28.2 sec  CARDIAC MARKERS ( 14 Oct 2020 11:00 )  x     / <0.01 ng/mL / x     / x     / x      CARDIAC MARKERS ( 14 Oct 2020 05:40 )  x     / <0.01 ng/mL / x     / x     / x      CARDIAC MARKERS ( 14 Oct 2020 03:40 )  x     / <0.01 ng/mL / x     / x     / x      CARDIAC MARKERS ( 14 Oct 2020 02:02 )  x     / x     / 137 U/L / x     / x               [facility]  [id number]  89y Female    Indication for ICU admission: sternal fx, bilateral first rib fx T4 fx with disc retropulsion  Admit Date:  10/14  ICU Date:      10/14  OR Date:       N/A    No Known Allergies    PAST MEDICAL & SURGICAL HISTORY:  Obesity  DM (diabetes mellitus)  Peptic ulcer  Hypertension  High blood cholesterol  Perforated chronic gastric ulcer  H/O abdominal hysterectomy  History of cholecystectomy    Home Medications:  acetaminophen-codeine #3: 1 tab(s) orally 2 times a day, As Needed (14 Oct 2020 05:45)  atenolol 25 mg oral tablet: 1 tab(s) orally once a day (14 Oct 2020 05:45)  atorvastatin 10 mg oral tablet: 1 tab(s) orally once a day (14 Oct 2020 05:45)  levothyroxine 25 mcg (0.025 mg) oral tablet: 1 tab(s) orally once a day (14 Oct 2020 05:45)  meloxicam 7.5 mg oral tablet: 1 tab(s) orally once a day, As Needed (14 Oct 2020 05:45)  nystatin 100,000 units/g topical powder: Apply topically to affected area 3 times a day, As Needed (14 Oct 2020 05:45)      24HRS EVENT:  Seen by NSX- [place] brace ordered, MRI complete - pending read  CE neg x 3  echo 10/14: EF 67%, G1DD, mild MR/TR, borderline pulm HTN  A1C 7.1    O/N: HTN to 190s, gave labetalol 10 x1 with improvement to 140s  fitted for [place] brace      DVT PTX: HSQ    GI PTX:     Protonix    ***Tubes/Lines/Drains  ***  Peripheral IV    REVIEW OF SYSTEMS    [x] A ten-point review of systems was otherwise negative except as noted.  [ ] Due to altered mental status/intubation, subjective information were not able to be obtained from the patient. History was obtained, to the extent possible, from review of the chart and collateral sources of information.      Daily     Daily     Diet, Consistent Carbohydrate/No Snacks (10-14-20 @ 18:06)      CURRENT MEDS:  Neurologic Medications  acetaminophen   Tablet .. 650 milliGRAM(s) Oral every 6 hours  oxyCODONE    IR 5 milliGRAM(s) Oral every 6 hours PRN Moderate Pain (4 - 6)    Respiratory Medications    Cardiovascular Medications  ATENolol  Tablet 25 milliGRAM(s) Oral daily    Gastrointestinal Medications  pantoprazole    Tablet 40 milliGRAM(s) Oral before breakfast    Genitourinary Medications    Hematologic/Oncologic Medications  heparin   Injectable 5000 Unit(s) SubCutaneous every 8 hours    Antimicrobial/Immunologic Medications    Endocrine/Metabolic Medications  atorvastatin Oral Tab/Cap - Peds 10 milliGRAM(s) Oral daily  dextrose 50% Injectable 25 Gram(s) IV Push once  insulin regular  human corrective regimen sliding scale   IV Push Before meals and at bedtime  levothyroxine 25 MICROGram(s) Oral daily    Topical/Other Medications      ICU Vital Signs Last 24 Hrs  T(C): 36.6 (14 Oct 2020 14:00), Max: 36.6 (14 Oct 2020 14:00)  T(F): 97.8 (14 Oct 2020 14:00), Max: 97.8 (14 Oct 2020 14:00)  HR: 68 (15 Oct 2020 04:00) (60 - 75)  BP: 141/62 (15 Oct 2020 04:00) (123/60 - 165/80)  BP(mean): 89 (15 Oct 2020 04:00) (81 - 120)  ABP: --  ABP(mean): --  RR: 20 (14 Oct 2020 07:25) (20 - 20)  SpO2: 92% (15 Oct 2020 04:00) (92% - 100%)      Adult Advanced Hemodynamics Last 24 Hrs  CVP(mm Hg): --  CVP(cm H2O): --  CO: --  CI: --  PA: --  PA(mean): --  PCWP: --  SVR: --  SVRI: --  PVR: --  PVRI: --          I&O's Summary    14 Oct 2020 07:01  -  15 Oct 2020 04:43  --------------------------------------------------------  IN: 875 mL / OUT: 300 mL / NET: 575 mL      I&O's Detail    14 Oct 2020 07:01  -  15 Oct 2020 04:43  --------------------------------------------------------  IN:    IV PiggyBack: 50 mL    Lactated Ringers: 825 mL  Total IN: 875 mL    OUT:    Voided (mL): 300 mL  Total OUT: 300 mL    Total NET: 575 mL          PHYSICAL EXAM:    General/Neuro  alert & oriented x 3, no focal deficits  CN II-XII intact    Lungs:      clear to auscultation, Normal expansion/effort.   2L NC    Cardiovascular : S1, S2.  Regular rate and rhythm.  Peripheral edema   Cardiac Rhythm: Normal Sinus Rhythm    GI: Abdomen soft, Non-tender, Non-distended.      Extremities: Extremities warm, pink, well-perfused. Pulses:Intact bilaterally.     Derm: Good skin turgor, no skin breakdown.      :    voiding     MSK: Mild ttp over right patella, patient able to fully range all extremities.       CXR:       LABS:  CAPILLARY BLOOD GLUCOSE      POCT Blood Glucose.: 126 mg/dL (14 Oct 2020 21:33)  POCT Blood Glucose.: 108 mg/dL (14 Oct 2020 17:47)  POCT Blood Glucose.: 135 mg/dL (14 Oct 2020 14:22)                          12.5   7.79  )-----------( 146      ( 15 Oct 2020 01:12 )             38.5       10-15    140  |  109  |  12  ----------------------------<  156<H>  4.0   |  23  |  0.8    Ca    8.4<L>      15 Oct 2020 01:12  Phos  3.0     10-15  Mg     2.3     10-15    TPro  7.3  /  Alb  4.3  /  TBili  0.7  /  DBili  x   /  AST  34  /  ALT  18  /  AlkPhos  103  10-14      PT/INR - ( 15 Oct 2020 01:12 )   PT: 11.30 sec;   INR: 0.98 ratio         PTT - ( 15 Oct 2020 01:12 )  PTT:28.2 sec  CARDIAC MARKERS ( 14 Oct 2020 11:00 )  x     / <0.01 ng/mL / x     / x     / x      CARDIAC MARKERS ( 14 Oct 2020 05:40 )  x     / <0.01 ng/mL / x     / x     / x      CARDIAC MARKERS ( 14 Oct 2020 03:40 )  x     / <0.01 ng/mL / x     / x     / x      CARDIAC MARKERS ( 14 Oct 2020 02:02 )  x     / x     / 137 U/L / x     / x

## 2020-10-16 LAB
ANION GAP SERPL CALC-SCNC: 11 MMOL/L — SIGNIFICANT CHANGE UP (ref 7–14)
BASOPHILS # BLD AUTO: 0.01 K/UL — SIGNIFICANT CHANGE UP (ref 0–0.2)
BASOPHILS NFR BLD AUTO: 0.1 % — SIGNIFICANT CHANGE UP (ref 0–1)
BUN SERPL-MCNC: 11 MG/DL — SIGNIFICANT CHANGE UP (ref 10–20)
CALCIUM SERPL-MCNC: 8.9 MG/DL — SIGNIFICANT CHANGE UP (ref 8.5–10.1)
CHLORIDE SERPL-SCNC: 108 MMOL/L — SIGNIFICANT CHANGE UP (ref 98–110)
CO2 SERPL-SCNC: 20 MMOL/L — SIGNIFICANT CHANGE UP (ref 17–32)
CREAT SERPL-MCNC: 0.7 MG/DL — SIGNIFICANT CHANGE UP (ref 0.7–1.5)
EOSINOPHIL # BLD AUTO: 0 K/UL — SIGNIFICANT CHANGE UP (ref 0–0.7)
EOSINOPHIL NFR BLD AUTO: 0 % — SIGNIFICANT CHANGE UP (ref 0–8)
GLUCOSE BLDC GLUCOMTR-MCNC: 121 MG/DL — HIGH (ref 70–99)
GLUCOSE BLDC GLUCOMTR-MCNC: 161 MG/DL — HIGH (ref 70–99)
GLUCOSE BLDC GLUCOMTR-MCNC: 170 MG/DL — HIGH (ref 70–99)
GLUCOSE BLDC GLUCOMTR-MCNC: 183 MG/DL — HIGH (ref 70–99)
GLUCOSE SERPL-MCNC: 122 MG/DL — HIGH (ref 70–99)
HCT VFR BLD CALC: 40.2 % — SIGNIFICANT CHANGE UP (ref 37–47)
HGB BLD-MCNC: 12.9 G/DL — SIGNIFICANT CHANGE UP (ref 12–16)
IMM GRANULOCYTES NFR BLD AUTO: 0.9 % — HIGH (ref 0.1–0.3)
LYMPHOCYTES # BLD AUTO: 1.73 K/UL — SIGNIFICANT CHANGE UP (ref 1.2–3.4)
LYMPHOCYTES # BLD AUTO: 20.3 % — LOW (ref 20.5–51.1)
MAGNESIUM SERPL-MCNC: 2 MG/DL — SIGNIFICANT CHANGE UP (ref 1.8–2.4)
MCHC RBC-ENTMCNC: 28.7 PG — SIGNIFICANT CHANGE UP (ref 27–31)
MCHC RBC-ENTMCNC: 32.1 G/DL — SIGNIFICANT CHANGE UP (ref 32–37)
MCV RBC AUTO: 89.5 FL — SIGNIFICANT CHANGE UP (ref 81–99)
MONOCYTES # BLD AUTO: 1.03 K/UL — HIGH (ref 0.1–0.6)
MONOCYTES NFR BLD AUTO: 12.1 % — HIGH (ref 1.7–9.3)
NEUTROPHILS # BLD AUTO: 5.66 K/UL — SIGNIFICANT CHANGE UP (ref 1.4–6.5)
NEUTROPHILS NFR BLD AUTO: 66.6 % — SIGNIFICANT CHANGE UP (ref 42.2–75.2)
NRBC # BLD: 0 /100 WBCS — SIGNIFICANT CHANGE UP (ref 0–0)
PHOSPHATE SERPL-MCNC: 2.5 MG/DL — SIGNIFICANT CHANGE UP (ref 2.1–4.9)
PLATELET # BLD AUTO: 163 K/UL — SIGNIFICANT CHANGE UP (ref 130–400)
POTASSIUM SERPL-MCNC: 4.5 MMOL/L — SIGNIFICANT CHANGE UP (ref 3.5–5)
POTASSIUM SERPL-SCNC: 4.5 MMOL/L — SIGNIFICANT CHANGE UP (ref 3.5–5)
RBC # BLD: 4.49 M/UL — SIGNIFICANT CHANGE UP (ref 4.2–5.4)
RBC # FLD: 14.1 % — SIGNIFICANT CHANGE UP (ref 11.5–14.5)
SODIUM SERPL-SCNC: 139 MMOL/L — SIGNIFICANT CHANGE UP (ref 135–146)
WBC # BLD: 8.51 K/UL — SIGNIFICANT CHANGE UP (ref 4.8–10.8)
WBC # FLD AUTO: 8.51 K/UL — SIGNIFICANT CHANGE UP (ref 4.8–10.8)

## 2020-10-16 PROCEDURE — 99222 1ST HOSP IP/OBS MODERATE 55: CPT

## 2020-10-16 PROCEDURE — 99232 SBSQ HOSP IP/OBS MODERATE 35: CPT

## 2020-10-16 RX ORDER — POLYETHYLENE GLYCOL 3350 17 G/17G
17 POWDER, FOR SOLUTION ORAL ONCE
Refills: 0 | Status: DISCONTINUED | OUTPATIENT
Start: 2020-10-16 | End: 2020-10-17

## 2020-10-16 RX ORDER — IPRATROPIUM/ALBUTEROL SULFATE 18-103MCG
3 AEROSOL WITH ADAPTER (GRAM) INHALATION EVERY 6 HOURS
Refills: 0 | Status: DISCONTINUED | OUTPATIENT
Start: 2020-10-16 | End: 2020-10-17

## 2020-10-16 RX ORDER — AMLODIPINE BESYLATE 2.5 MG/1
5 TABLET ORAL DAILY
Refills: 0 | Status: DISCONTINUED | OUTPATIENT
Start: 2020-10-16 | End: 2020-10-17

## 2020-10-16 RX ORDER — SENNA PLUS 8.6 MG/1
2 TABLET ORAL AT BEDTIME
Refills: 0 | Status: DISCONTINUED | OUTPATIENT
Start: 2020-10-16 | End: 2020-10-17

## 2020-10-16 RX ADMIN — INSULIN HUMAN 3: 100 INJECTION, SOLUTION SUBCUTANEOUS at 08:04

## 2020-10-16 RX ADMIN — ATENOLOL 25 MILLIGRAM(S): 25 TABLET ORAL at 05:27

## 2020-10-16 RX ADMIN — Medication 650 MILLIGRAM(S): at 11:55

## 2020-10-16 RX ADMIN — HEPARIN SODIUM 5000 UNIT(S): 5000 INJECTION INTRAVENOUS; SUBCUTANEOUS at 21:09

## 2020-10-16 RX ADMIN — Medication 650 MILLIGRAM(S): at 23:30

## 2020-10-16 RX ADMIN — PANTOPRAZOLE SODIUM 40 MILLIGRAM(S): 20 TABLET, DELAYED RELEASE ORAL at 05:27

## 2020-10-16 RX ADMIN — Medication 650 MILLIGRAM(S): at 17:17

## 2020-10-16 RX ADMIN — Medication 650 MILLIGRAM(S): at 00:13

## 2020-10-16 RX ADMIN — HEPARIN SODIUM 5000 UNIT(S): 5000 INJECTION INTRAVENOUS; SUBCUTANEOUS at 05:27

## 2020-10-16 RX ADMIN — Medication 650 MILLIGRAM(S): at 05:59

## 2020-10-16 RX ADMIN — ATORVASTATIN CALCIUM 10 MILLIGRAM(S): 80 TABLET, FILM COATED ORAL at 22:00

## 2020-10-16 RX ADMIN — SENNA PLUS 2 TABLET(S): 8.6 TABLET ORAL at 21:09

## 2020-10-16 RX ADMIN — Medication 650 MILLIGRAM(S): at 05:27

## 2020-10-16 RX ADMIN — INSULIN HUMAN 5: 100 INJECTION, SOLUTION SUBCUTANEOUS at 17:20

## 2020-10-16 RX ADMIN — HEPARIN SODIUM 5000 UNIT(S): 5000 INJECTION INTRAVENOUS; SUBCUTANEOUS at 13:36

## 2020-10-16 RX ADMIN — Medication 650 MILLIGRAM(S): at 23:00

## 2020-10-16 RX ADMIN — Medication 25 MICROGRAM(S): at 05:27

## 2020-10-16 RX ADMIN — Medication 650 MILLIGRAM(S): at 13:00

## 2020-10-16 NOTE — DISCHARGE NOTE NURSING/CASE MANAGEMENT/SOCIAL WORK - PATIENT PORTAL LINK FT
You can access the FollowMyHealth Patient Portal offered by [facility] by registering at the following website: [email or web address]. By joining [facility]’s FollowMyHealth portal, you will also be able to view your health information using other applications (apps) compatible with our system.

## 2020-10-16 NOTE — CHART NOTE - NSCHARTNOTEFT_GEN_A_CORE
pt has a dx T4 compression fracture with impairing mobility , patient will be staying on one floor and will not be in close proximity to bathroom requiring need of commode

## 2020-10-16 NOTE — CONSULT NOTE ADULT - REASON FOR ADMISSION
Trauma Consult, Sternal Fracture, Rib Fractures, and T4 Compression Fracture s/p Fall down 15 steps
Trauma Consult, Sternal Fracture, Rib Fractures, and T4 Compression Fracture s/p Fall down 15 steps

## 2020-10-16 NOTE — CHART NOTE - NSCHARTNOTEFT_GEN_A_CORE
LMN for hospital bed for cardiopulmonary reasons-  89 yo female admitted for fall resulting T4 compression fracture     Patient requires the head of the bed to be elevated more than thurty degrees most of the time     Patient also requires frequent bed positioning of the body in ways not feasible with an ordinary bed due to back trauma to alleviate pain. Pillows and wedges have been considered and ruled out.     Gel Overlay mattress- Patient will need a gel overlay to help with the healing of current skin breakdown and to prevent further skin breakdown. It i medically necessary for patient to receive gel overlay due to her medical diagnosis.

## 2020-10-16 NOTE — PHYSICAL THERAPY INITIAL EVALUATION ADULT - GENERAL OBSERVATIONS, REHAB EVAL
14:55-15:25. Pt encountered semifowlers in bed in NAD. +[employer] rep present at b/s fitting pt for Christiano brace. +pts dtrs present at b/s. +3LO2 n/c. Pt agreeable to PT IE. PT IE Complete. Pt educated on seated/supine therex. Pt educated to continue ambulation with [facility] staff. PT to f/u.

## 2020-10-16 NOTE — CONSULT NOTE ADULT - ASSESSMENT
89yF w/ PMHx of COPD-emphysema (was supposed to have home O2 but was not arranged due to height of COVID 19 pandemic March-July 2020), Hypothyroidism with thyroid nodules, HTN, Hysterectomy, Laparoscopic Cholecystectomy 15 years ago, and Perforated gastric ulcer 27 years ago seen as trauma consult s/p fall down 15 steps at home.  Patient admitted for further management by trauma service.      Hypoxia-likely secondary to emphysema:  Per daughter at bedside, patient was diagnosed with COPD as outpatient and home O2 was never established due to issues during COVID 19.  CT chest with Contrast does demonstrate centrilobular emphysema  CXR 10/15 shows no acute cardiopulmonary disease  TTE was done on this admission; shows EF 67%, grade 1 diastolic dysfunction and borderline pulm HTN, otherwise normal.  Troponin normal.  Per daughter, patient does have rescue inhaler at home prescribed by her PMD which she rarely uses.  Will order duonebs PRN here  Patient is not in acute COPD exacerbation; lungs are clear, she is speaking in full sentences, no wheezes on exam.  Keep O2 sat 88-92%, continue O2 via nasal cannula.  Patient will need to be discharged on home O2; CM arranging  Continue incentive spirometry    Essential hypertension:  Patient only on atenolol at home, review of outpatient PMD visit confirms this; there her last BP was 156/80.  Will add Norvasc 5 mg PO daily for better control  DASH diet    Hypothyroidism with nodules:  Follow by PMD  Continue Synthroid 25 mcg daily    DM2:  Continue insulin sliding scale  A1C 7.1%    HLD:  Continue Lipitor 10 mg     Patient is currently medically stable for discharge on home O2.  No need for transfer to medicine service at this time.    Thank you for allowing us to participate in the care of this patient.

## 2020-10-16 NOTE — PHYSICAL THERAPY INITIAL EVALUATION ADULT - PERTINENT HX OF CURRENT PROBLEM, REHAB EVAL
Pt is a 89yo F admitted s/p fall down stairs w Sternal fx, Rib fxs, and T4 compression fx. PMHx of Hypothyroidism with thyroid nodules, HTN, Hysterectomy, Laparoscopic Cholecystectomy 15 years ago, and Perforated gastric ulcer 27 years ago

## 2020-10-16 NOTE — PROGRESS NOTE ADULT - ASSESSMENT
ASSESSMENT:  89yF w/ PMHx of Hypothyroidism with thyroid nodules, HTN, Hysterectomy, Laparoscopic Cholecystectomy 15 years ago, and Perforated gastric ulcer 27 years ago seen as trauma consult s/p fall down 15 steps at home earlier this evening. Patient reports that she does not remember how she fell, but + Head trauma, - LoC, and denies anticoagulation. In the ED, patient complains of headache, mild anterior chest pain, and back pain.  Trauma assessment in ED: ABCs intact , GCS 15 , AAOx3.      plan   brace placement   pain control   IS

## 2020-10-16 NOTE — CONSULT NOTE ADULT - SUBJECTIVE AND OBJECTIVE BOX
89yF w/ PMHx of COPD-emphysema (was supposed to have home O2 but was not arranged due to height of COVID 19 pandemic March-July 2020), Hypothyroidism with thyroid nodules, HTN, Hysterectomy, Laparoscopic Cholecystectomy 15 years ago, and Perforated gastric ulcer 27 years ago seen as trauma consult s/p fall down 15 steps at home.  Patient admitted for further management by trauma service.    Patient seen at bedside, states she "has always felt a little winded while walking" for the past few years as per daughter at bedside.  She has regular follow ups with her PMD.  She was a smoker for "many years" prior to quitting 10-15 years ago.  Pack years not clear.    Per primary team she de-saturates to 86% while ambulating.        PAST MEDICAL & SURGICAL HISTORY:  Obesity  DM (diabetes mellitus)  Peptic ulcer  Hypertension  High blood cholesterol  Perforated chronic gastric ulcer  H/O abdominal hysterectomy  History of cholecystectomy        REVIEW OF SYSTEMS:    CONSTITUTIONAL: No fever, weight loss, or fatigue  EYES: No eye pain, visual disturbances, or discharge  ENMT:  No difficulty hearing, tinnitus, vertigo; No sinus or throat pain  NECK: No pain or stiffness  BREASTS: No pain, masses, or nipple discharge  RESPIRATORY: No cough, wheezing, chills or hemoptysis- mild YAN  CARDIOVASCULAR: No chest pain, palpitations, dizziness, or leg swelling  GASTROINTESTINAL: No abdominal or epigastric pain. No nausea, vomiting, or hematemesis; No diarrhea or constipation. No melena or hematochezia.  GENITOURINARY: No dysuria, frequency, hematuria, or incontinence  NEUROLOGICAL: No headaches, memory loss, loss of strength, numbness, or tremors  SKIN: No itching, burning, rashes, or lesions   LYMPH NODES: No enlarged glands  ENDOCRINE: No heat or cold intolerance; No hair loss  MUSCULOSKELETAL: Rib pain, Back pain  PSYCHIATRIC: No depression, anxiety, mood swings, or difficulty sleeping  HEME/LYMPH: No easy bruising, or bleeding gums  ALLERGY AND IMMUNOLOGIC: No hives or eczema      MEDICATIONS  (STANDING):  acetaminophen   Tablet .. 650 milliGRAM(s) Oral every 6 hours  ATENolol  Tablet 25 milliGRAM(s) Oral daily  atorvastatin Oral Tab/Cap - Peds 10 milliGRAM(s) Oral daily  heparin   Injectable 5000 Unit(s) SubCutaneous every 8 hours  insulin regular  human corrective regimen sliding scale   IV Push Before meals and at bedtime  labetalol Injectable 10 milliGRAM(s) IV Push once  levothyroxine 25 MICROGram(s) Oral daily  pantoprazole    Tablet 40 milliGRAM(s) Oral before breakfast  polyethylene glycol 3350 17 Gram(s) Oral once  senna 2 Tablet(s) Oral at bedtime          Allergies  No Known Allergies          Vital Signs Last 24 Hrs  T(C): 36.4 (16 Oct 2020 11:07), Max: 37.1 (15 Oct 2020 22:09)  T(F): 97.6 (16 Oct 2020 11:07), Max: 98.7 (15 Oct 2020 22:09)  HR: 85 (16 Oct 2020 11:07) (68 - 95)  BP: 170/68 (16 Oct 2020 15:04) (140/71 - 170/72)  RR: 18 (16 Oct 2020 05:15) (18 - 25)  SpO2: 94% (16 Oct 2020 11:07) (92% - 95%)    PHYSICAL EXAM:    GENERAL: NAD, well-groomed, well-developed  HEAD:  Atraumatic, Normocephalic  EYES: EOMI, PERRLA, conjunctiva and sclera clear  ENMT: No tonsillar erythema, exudates, or enlargement; Moist mucous membranes, Good dentition, No lesions  NECK: Supple, No JVD, Normal thyroid  NERVOUS SYSTEM:  Alert & Oriented X3, Good concentration  CHEST/LUNG: Clear to percussion bilaterally; No rales, rhonchi, wheezing, or rubs  HEART: Regular rate and rhythm; No murmurs, rubs, or gallops  ABDOMEN: Soft, Nontender, Nondistended; Bowel sounds present  EXTREMITIES:  2+ Peripheral Pulses, No clubbing, cyanosis, or edema  SKIN: No rashes or lesions  PSYCH: normal affect and behavior      LABS:                        12.6   8.98  )-----------( 141      ( 15 Oct 2020 20:01 )             38.6     10-15    141  |  106  |  10  ----------------------------<  174<H>  4.2   |  22  |  0.8    Ca    8.4<L>      15 Oct 2020 20:01  Phos  2.8     10-15  Mg     1.9     10-15      PT/INR - ( 15 Oct 2020 01:12 )   PT: 11.30 sec;   INR: 0.98 ratio         PTT - ( 15 Oct 2020 01:12 )  PTT:28.2 sec

## 2020-10-16 NOTE — DISCHARGE NOTE NURSING/CASE MANAGEMENT/SOCIAL WORK - NSTRANSFERBELONGINGSDISPO_GEN_A_NUR
pt only has one hearing aid.  as per pt and shirar, dentures and reading glassess are home/with patient

## 2020-10-16 NOTE — PROGRESS NOTE ADULT - SUBJECTIVE AND OBJECTIVE BOX
GENERAL SURGERY PROGRESS NOTE     HIEN SANCHEZ  89y  Female  Hospital day :2d  POD:  Procedure:   OVERNIGHT EVENTS: no acute overnight events     T(F): 97 (10-16-20 @ 05:15), Max: 98.7 (10-15-20 @ 22:09)  HR: 95 (10-16-20 @ 05:15) (68 - 95)  BP: 141/64 (10-16-20 @ 05:15) (122/56 - 187/91)  RR: 18 (10-16-20 @ 05:15) (18 - 25)  SpO2: 92% (10-16-20 @ 05:25) (88% - 95%)    DIET/FLUIDS:      GI proph:  pantoprazole    Tablet 40 milliGRAM(s) Oral before breakfast    AC/ proph: heparin   Injectable 5000 Unit(s) SubCutaneous every 8 hours    ABx:     PHYSICAL EXAM:  GENERAL: NAD, well-appearing  CHEST/LUNG: Clear to auscultation bilaterally  HEART: Regular rate and rhythm  ABDOMEN: Soft, Nontender, Nondistended;   EXTREMITIES:  No clubbing, cyanosis, or edema      LABS  Labs:  CAPILLARY BLOOD GLUCOSE      POCT Blood Glucose.: 170 mg/dL (16 Oct 2020 07:16)  POCT Blood Glucose.: 146 mg/dL (15 Oct 2020 22:18)  POCT Blood Glucose.: 148 mg/dL (15 Oct 2020 19:29)  POCT Blood Glucose.: 130 mg/dL (15 Oct 2020 16:35)  POCT Blood Glucose.: 173 mg/dL (15 Oct 2020 11:34)                          12.6   8.98  )-----------( 141      ( 15 Oct 2020 20:01 )             38.6       Auto Neutrophil %: 75.2 % (10-15-20 @ 20:01)  Auto Immature Granulocyte %: 0.4 % (10-15-20 @ 20:01)    10-15    141  |  106  |  10  ----------------------------<  174<H>  4.2   |  22  |  0.8      Calcium, Total Serum: 8.4 mg/dL (10-15-20 @ 20:01)      LFTs:     Blood Gas Venous - Lactate: 2.5 mmoL/L (10-14-20 @ 02:12)  Lactate, Blood: 2.5 mmol/L (10-14-20 @ 01:57)      Coags:     11.30  ----< 0.98    ( 15 Oct 2020 01:12 )     28.2        CARDIAC MARKERS ( 15 Oct 2020 20:01 )  x     / <0.01 ng/mL / 297 U/L / x     / 2.8 ng/mL  CARDIAC MARKERS ( 14 Oct 2020 11:00 )  x     / <0.01 ng/mL / x     / x     / x                      RADIOLOGY & ADDITIONAL TESTS:      A/P

## 2020-10-17 VITALS
RESPIRATION RATE: 18 BRPM | DIASTOLIC BLOOD PRESSURE: 79 MMHG | TEMPERATURE: 96 F | HEART RATE: 68 BPM | SYSTOLIC BLOOD PRESSURE: 177 MMHG

## 2020-10-17 LAB
GLUCOSE BLDC GLUCOMTR-MCNC: 140 MG/DL — HIGH (ref 70–99)
GLUCOSE BLDC GLUCOMTR-MCNC: 148 MG/DL — HIGH (ref 70–99)
GLUCOSE BLDC GLUCOMTR-MCNC: 163 MG/DL — HIGH (ref 70–99)

## 2020-10-17 PROCEDURE — 99232 SBSQ HOSP IP/OBS MODERATE 35: CPT

## 2020-10-17 RX ORDER — AMLODIPINE BESYLATE 2.5 MG/1
1 TABLET ORAL
Qty: 30 | Refills: 0
Start: 2020-10-17 | End: 2020-11-15

## 2020-10-17 RX ORDER — ACETAMINOPHEN 500 MG
2 TABLET ORAL
Qty: 240 | Refills: 0
Start: 2020-10-17 | End: 2020-11-15

## 2020-10-17 RX ORDER — SODIUM,POTASSIUM PHOSPHATES 278-250MG
1 POWDER IN PACKET (EA) ORAL ONCE
Refills: 0 | Status: COMPLETED | OUTPATIENT
Start: 2020-10-17 | End: 2020-10-17

## 2020-10-17 RX ORDER — ACETAMINOPHEN WITH CODEINE 300MG-30MG
1 TABLET ORAL
Qty: 0 | Refills: 0 | DISCHARGE

## 2020-10-17 RX ADMIN — HEPARIN SODIUM 5000 UNIT(S): 5000 INJECTION INTRAVENOUS; SUBCUTANEOUS at 05:36

## 2020-10-17 RX ADMIN — AMLODIPINE BESYLATE 5 MILLIGRAM(S): 2.5 TABLET ORAL at 05:35

## 2020-10-17 RX ADMIN — Medication 650 MILLIGRAM(S): at 11:41

## 2020-10-17 RX ADMIN — Medication 25 MICROGRAM(S): at 05:35

## 2020-10-17 RX ADMIN — Medication 650 MILLIGRAM(S): at 17:32

## 2020-10-17 RX ADMIN — INSULIN HUMAN 3: 100 INJECTION, SOLUTION SUBCUTANEOUS at 07:37

## 2020-10-17 RX ADMIN — PANTOPRAZOLE SODIUM 40 MILLIGRAM(S): 20 TABLET, DELAYED RELEASE ORAL at 05:36

## 2020-10-17 RX ADMIN — Medication 650 MILLIGRAM(S): at 06:05

## 2020-10-17 RX ADMIN — HEPARIN SODIUM 5000 UNIT(S): 5000 INJECTION INTRAVENOUS; SUBCUTANEOUS at 13:28

## 2020-10-17 RX ADMIN — Medication 1 PACKET(S): at 05:35

## 2020-10-17 RX ADMIN — ATENOLOL 25 MILLIGRAM(S): 25 TABLET ORAL at 05:35

## 2020-10-17 RX ADMIN — Medication 650 MILLIGRAM(S): at 05:35

## 2020-10-17 NOTE — DISCHARGE NOTE PROVIDER - CARE PROVIDER_API CALL
Arias Naidu  NEUROSURGERY  [address] [place] [address], [address]  [place] [address]  Phone: [phone]  Fax: [phone]  Follow Up Time: 2 weeks    Brent Tapia)  Internal Medicine  [address] [place] [address]  Phone: [phone]  Fax: [phone]  Follow Up Time: 2 weeks

## 2020-10-17 NOTE — DISCHARGE NOTE PROVIDER - CARE PROVIDERS DIRECT ADDRESSES
[email or web address]

## 2020-10-17 NOTE — PROGRESS NOTE ADULT - SUBJECTIVE AND OBJECTIVE BOX
GENERAL SURGERY PROGRESS NOTE     HIEN SANCHEZ  89y  Female  Hospital day :3d  POD:  Procedure:   OVERNIGHT EVENTS: no acute overnight events     T(F): 97.4 (10-16-20 @ 20:32), Max: 97.6 (10-16-20 @ 11:07)  HR: 71 (10-16-20 @ 20:32) (71 - 95)  BP: 160/94 (10-16-20 @ 20:32) (141/64 - 170/72)  RR: 18 (10-16-20 @ 20:32) (18 - 18)  SpO2: 94% (10-16-20 @ 20:11) (92% - 94%)    DIET/FLUIDS:      GI proph:  pantoprazole    Tablet 40 milliGRAM(s) Oral before breakfast    AC/ proph: heparin   Injectable 5000 Unit(s) SubCutaneous every 8 hours    ABx:     PHYSICAL EXAM:  GENERAL: NAD, well-appearing  CHEST/LUNG: Clear to auscultation bilaterally  HEART: Regular rate and rhythm  ABDOMEN: Soft, Nontender, Nondistended;   EXTREMITIES:  No clubbing, cyanosis, or edema      LABS  Labs:  CAPILLARY BLOOD GLUCOSE      POCT Blood Glucose.: 121 mg/dL (16 Oct 2020 21:26)  POCT Blood Glucose.: 183 mg/dL (16 Oct 2020 16:41)  POCT Blood Glucose.: 161 mg/dL (16 Oct 2020 11:35)  POCT Blood Glucose.: 170 mg/dL (16 Oct 2020 07:16)                          12.9   8.51  )-----------( 163      ( 16 Oct 2020 20:00 )             40.2       Auto Neutrophil %: 66.6 % (10-16-20 @ 20:00)  Auto Immature Granulocyte %: 0.9 % (10-16-20 @ 20:00)    10-16    139  |  108  |  11  ----------------------------<  122<H>  4.5   |  20  |  0.7      Calcium, Total Serum: 8.9 mg/dL (10-16-20 @ 20:00)      LFTs:         Coags:    CARDIAC MARKERS ( 15 Oct 2020 20:01 )  x     / <0.01 ng/mL / 297 U/L / x     / 2.8 ng/mL                  RADIOLOGY & ADDITIONAL TESTS:      A/P

## 2020-10-17 NOTE — DISCHARGE NOTE PROVIDER - HOSPITAL COURSE
89yF w/ PMHx of Hypothyroidism with thyroid nodules, HTN, Hysterectomy, Laparoscopic Cholecystectomy 15 years ago, and Perforated gastric ulcer 27 years ago seen as trauma consult s/p fall down 15 steps at home earlier this evening. Patient reports that she does not remember how she fell, but + Head trauma, - LoC, and denies anticoagulation. In the ED, patient complains of headache, mild anterior chest pain, and back pain.  Trauma assessment in ED: ABCs intact , GCS 15 , AAOx3. Injuries identified. There is an acute minimally displaced fracture of the right posterior first rib and nondisplaced fracture of the right posterior second rib. There are acute mildly displaced fractures of the left posterior first and second ribs. Suspect nondisplaced fracture of the left transverse process of T1. Severe acute appearing compression fracture of T4, with retropulsion of the posterior inferior aspect of the vertebral body. Likely additional superior endplate compression fracture of T5. Age-indeterminate sternal fracture. Neurosurgery evaluated her and recommended bedrest and further evaluation with an MRI. Patient was placed in SICU for her age for close hemodynamic monitoring. MRI was reviewed and neurosurgery recommended no surgical intervention at this time and to use a anette brace. Patient was doing well and was downgraded to the floor. Pt has been on/off nasal canula and desats at interval times to 85-88%. Patient has a history of COPD that was no prior documentation of workup. Medicine was consulted, who recommended cleared for discharge on home O2. Today patient was seen, vitally stable, no other complaints and deemed ready for discharge.

## 2020-10-17 NOTE — DISCHARGE NOTE PROVIDER - NSDCMRMEDTOKEN_GEN_ALL_CORE_FT
atenolol 25 mg oral tablet: 1 tab(s) orally once a day  atorvastatin 10 mg oral tablet: 1 tab(s) orally once a day  levothyroxine 25 mcg (0.025 mg) oral tablet: 1 tab(s) orally once a day  meloxicam 7.5 mg oral tablet: 1 tab(s) orally once a day, As Needed  nystatin 100,000 units/g topical powder: Apply topically to affected area 3 times a day, As Needed   acetaminophen 325 mg oral tablet: 2 tab(s) orally every 6 hours MDD:6 tablets  PRN  amLODIPine 5 mg oral tablet: 1 tab(s) orally once a day  atenolol 25 mg oral tablet: 1 tab(s) orally once a day  atorvastatin 10 mg oral tablet: 1 tab(s) orally once a day  levothyroxine 25 mcg (0.025 mg) oral tablet: 1 tab(s) orally once a day  meloxicam 7.5 mg oral tablet: 1 tab(s) orally once a day, As Needed  nystatin 100,000 units/g topical powder: Apply topically to affected area 3 times a day, As Needed

## 2020-10-17 NOTE — DISCHARGE NOTE PROVIDER - NSFOLLOWUPCLINICS_GEN_ALL_ED_FT
[facility]  Trauma Surgery  [address] [place] [address]  Phone: [phone]  Fax:   Follow Up Time:

## 2020-10-17 NOTE — DISCHARGE NOTE PROVIDER - PROVIDER TOKENS
PROVIDER:[TOKEN:[[id number]],FOLLOWUP:[2 weeks]],PROVIDER:[TOKEN:[[id number]],FOLLOWUP:[2 weeks]]

## 2020-10-17 NOTE — PROGRESS NOTE ADULT - ASSESSMENT
ASSESSMENT:  89yF w/ PMHx of Hypothyroidism with thyroid nodules, HTN, Hysterectomy, Laparoscopic Cholecystectomy 15 years ago, and Perforated gastric ulcer 27 years ago seen as trauma consult s/p fall down 15 steps at home earlier this evening. Patient reports that she does not remember how she fell, but + Head trauma, - LoC, and denies anticoagulation. In the ED, patient complains of headache, mild anterior chest pain, and back pain.  Trauma assessment in ED: ABCs intact , GCS 15 , AAOx3.      plan   home o2 setup before discharge   fu pt and rehab   pain control   IS   fu dispo plan

## 2020-10-17 NOTE — PROGRESS NOTE ADULT - REASON FOR ADMISSION
Trauma Consult, Sternal Fracture, Rib Fractures, and T4 Compression Fracture s/p Fall down 15 steps

## 2020-10-17 NOTE — DISCHARGE NOTE PROVIDER - NSDCFUADDAPPT_GEN_ALL_CORE_FT
Call for appointment    Use Jewitt brace when out of bed, may remove when sleeping. May ambulate as tolerated. Avoid heavy lifting and leaning forward.    Always use home O2

## 2020-10-17 NOTE — DISCHARGE NOTE PROVIDER - NSDCCPCAREPLAN_GEN_ALL_CORE_FT
PRINCIPAL DISCHARGE DIAGNOSIS  Diagnosis: Compression fracture of T4 vertebra, initial encounter  Assessment and Plan of Treatment: Compression fracture of T4 vertebra, initial encounter      SECONDARY DISCHARGE DIAGNOSES  Diagnosis: Skin tear of forearm without complication  Assessment and Plan of Treatment:     Diagnosis: Closed head injury  Assessment and Plan of Treatment:     Diagnosis: Accidental fall  Assessment and Plan of Treatment:     Diagnosis: Sternal fracture  Assessment and Plan of Treatment:

## 2020-10-17 NOTE — PROGRESS NOTE ADULT - ATTENDING COMMENTS
I examined the patient with the PA/Resident and discussed my plan with the Resident/PA.   I agree with the above resident/pa note unless directly contradicted below.     HIEN [id number] s/p fall t4 fx    Christiano Mccray while oob, Medicine consult -- home o2 with outpatient follow up , pain controlled. DC HOME TODAY

## 2020-10-17 NOTE — CHART NOTE - NSCHARTNOTEFT_GEN_A_CORE
Letter on Medical necessity:     Patient’s 02 sat is 86% RA at rest. Patient admitted s/p fall with sternal fx, bilateral first rib fx, T4 compression fx with disc retropulsion causing pain and hypoxia therefore needing home 02. Despite pain management, TLSO brace for support, and physical therapy- due to poor functional status, Pt remains hypoxic. Pt also with + h/o smoking with a likely underlying condition of COPD. Patient/family are aware patient will be going home on oxygen. Patient was tested in a chronic, stable state. Letter on Medical necessity:     Patient’s 02 sat is 86% RA at rest. Patient admitted s/p fall with sternal fx, bilateral first rib fx, T4 compression fx with disc retropulsion causing pain and hypoxia therefore needing home 02. Despite pain management, TLSO brace for support, and physical therapy- due to poor functional status, Pt remains hypoxic. Pt also with + h/o smoking with a h/o of COPD. Patient/family are aware patient will be going home on oxygen. Patient was tested in a chronic, stable state.
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
## 2020-10-21 DIAGNOSIS — S22.43XA MULTIPLE FRACTURES OF RIBS, BILATERAL, INITIAL ENCOUNTER FOR CLOSED FRACTURE: ICD-10-CM

## 2020-10-21 DIAGNOSIS — S22.050A WEDGE COMPRESSION FRACTURE OF T5-T6 VERTEBRA, INITIAL ENCOUNTER FOR CLOSED FRACTURE: ICD-10-CM

## 2020-10-21 DIAGNOSIS — I10 ESSENTIAL (PRIMARY) HYPERTENSION: ICD-10-CM

## 2020-10-21 DIAGNOSIS — S22.019A UNSPECIFIED FRACTURE OF FIRST THORACIC VERTEBRA, INITIAL ENCOUNTER FOR CLOSED FRACTURE: ICD-10-CM

## 2020-10-21 DIAGNOSIS — E66.9 OBESITY, UNSPECIFIED: ICD-10-CM

## 2020-10-21 DIAGNOSIS — S22.20XA UNSPECIFIED FRACTURE OF STERNUM, INITIAL ENCOUNTER FOR CLOSED FRACTURE: ICD-10-CM

## 2020-10-21 DIAGNOSIS — E03.9 HYPOTHYROIDISM, UNSPECIFIED: ICD-10-CM

## 2020-10-21 DIAGNOSIS — Z90.49 ACQUIRED ABSENCE OF OTHER SPECIFIED PARTS OF DIGESTIVE TRACT: ICD-10-CM

## 2020-10-21 DIAGNOSIS — S51.801A UNSPECIFIED OPEN WOUND OF RIGHT FOREARM, INITIAL ENCOUNTER: ICD-10-CM

## 2020-10-21 DIAGNOSIS — S22.030A WEDGE COMPRESSION FRACTURE OF THIRD THORACIC VERTEBRA, INITIAL ENCOUNTER FOR CLOSED FRACTURE: ICD-10-CM

## 2020-10-21 DIAGNOSIS — Z87.891 PERSONAL HISTORY OF NICOTINE DEPENDENCE: ICD-10-CM

## 2020-10-21 DIAGNOSIS — Z87.11 PERSONAL HISTORY OF PEPTIC ULCER DISEASE: ICD-10-CM

## 2020-10-21 DIAGNOSIS — S09.90XA UNSPECIFIED INJURY OF HEAD, INITIAL ENCOUNTER: ICD-10-CM

## 2020-10-21 DIAGNOSIS — S24.102A UNSPECIFIED INJURY AT T2-T6 LEVEL OF THORACIC SPINAL CORD, INITIAL ENCOUNTER: ICD-10-CM

## 2020-10-21 DIAGNOSIS — J43.2 CENTRILOBULAR EMPHYSEMA: ICD-10-CM

## 2020-10-21 DIAGNOSIS — S22.040A WEDGE COMPRESSION FRACTURE OF FOURTH THORACIC VERTEBRA, INITIAL ENCOUNTER FOR CLOSED FRACTURE: ICD-10-CM

## 2020-10-21 DIAGNOSIS — Z79.890 HORMONE REPLACEMENT THERAPY: ICD-10-CM

## 2020-10-21 DIAGNOSIS — M19.90 UNSPECIFIED OSTEOARTHRITIS, UNSPECIFIED SITE: ICD-10-CM

## 2020-10-21 DIAGNOSIS — E11.9 TYPE 2 DIABETES MELLITUS WITHOUT COMPLICATIONS: ICD-10-CM
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
## 2020-10-21 DIAGNOSIS — Z90.710 ACQUIRED ABSENCE OF BOTH CERVIX AND UTERUS: ICD-10-CM

## 2020-10-21 DIAGNOSIS — D64.9 ANEMIA, UNSPECIFIED: ICD-10-CM

## 2020-10-21 DIAGNOSIS — S22.020A WEDGE COMPRESSION FRACTURE OF SECOND THORACIC VERTEBRA, INITIAL ENCOUNTER FOR CLOSED FRACTURE: ICD-10-CM

## 2020-11-02 NOTE — DISCHARGE NOTE PROVIDER - NSDCHHPTASSISTHOME_GEN_ALL_CORE
Discharge summary:      Patient eloped without notifying staff.     See assessment and plan for the day below:    Main Norman is a 32 year old male who presents with altered mental status     Asymptomatic COVID-19 sent, low suspicion     Opiate overdose  Methamphetamine use disorder  H/o Etoh use disorder  H/o cocaine use  Per Care Everywhere has been to ED in past for opiate overdose as well as methamphetamine use. Pt was found passed out on the bathroom floor at the [facility] transit [place]. He denies drug use but per EMS had syringes on him. Pt states took Xanax x 1 tablet off street. In ED given narcan x 1 IV and woke up for awhile but then became more somnolent and minimally responsive. BS low/ normal. Vitals stable. Labs remarkable for AST 70/ , alk phos of 197. Blood alcohol negative, urine tox positive for ampheamines and opiates, negative for benzos. Unclear if recent alcohol.   - ICU, telemetry, continuous O2 monitoring is stable overnight  - discontinued narcan gtt  - CIWA score is zero  - IV fluids, SL if taking adequate po  - prn antiemetics  - transfer to [facility] telemetry  - CD evaluation     Elevated LFTs  With elevated AST/ALT and alk phos on admission. No recent comparison. Denies abdominal pain. Suspect related to the above.   - repeat labs improved, will not check further     DVT Prophylaxis: Pneumatic Compression Devices and Ambulate every shift     Code Status: Full Code     Disposition: Expected discharge on 10/7         Patient Needs Assistance to Leave Residence...

## 2021-05-07 NOTE — ED ADULT NURSE NOTE - NEURO ASSESSMENT
Progress Note      Patient Name: Rudy Moore   Patient ID: [id number]   Sex: Male   YOB: 1941        Visit Date: October 30, 2020    Provider: RUSS Jordan   Location: [facility] [place]   Location Address: [address] [place] [address] [place]  [address]   Location Phone: [phone]          Chief Complaint     follow up from leg surgery  he had surgery on his neck also       History Of Present Illness  Rudy Moore is a 78 year old /White male who presents for evaluation and treatment of:      follow up from vascular surgeries     He had a right carotid endarterectomy on 8/3/2020 - Dr. Parnell with [facility] - he did well with that and he does not have to have the left side done.     He then went in on 10/13/2020 for bilateral femoral endarterectomies with iliac stenting - he did very well post-op and he has a follow up with Dr. Parnell coming up in early/mid November. He feels a lot better - the claudication is significantly improved - he isn't having to sit near as much to relieve the leg discomfort. He is overall really pleased with Dr. Parnell and his surgeries.            Past Medical History  Disease Name Date Onset Notes   CAD (coronary artery disease) --  --    Cataract --  --    Diabetes mellitus, type 2 --  --    Essential hypertension 07/23/2020 --    GERD (gastroesophageal reflux disease) --  --    Hyperlipidemia --  --    Kidney stones --  --    Peripheral arterial disease 07/23/2020 --    Seasonal allergies --  --          Past Surgical History  Procedure Name Date Notes   Heart Bypass --  --          Medication List  Name Date Started Instructions   Aspir-81 81 mg oral tablet,delayed release (/EC)  --    atorvastatin 20 mg oral tablet 07/23/2020 take 1 tablet (20 mg) by oral route once daily for 90 days   BD Alcohol Swabs topical pads, medicated 09/29/2020 USE EVERY DAY   cilostazol 100 mg oral tablet  take 1 tablet (100 mg) by oral  route 2 times per day 1/2 hour before or 2 hours after breakfast and dinner   metoprolol tartrate 50 mg oral tablet 07/23/2020 TAKE 1 TAB DAILY   quinapril 40 mg oral tablet 07/23/2020 take 1 tablet (40 mg) by oral route once daily for 90 days   True Comfort Lancet 30 gauge miscellaneous misc 05/01/2019 test twice daily   True Metrix Air Glucose Meter miscellaneous kit 04/26/2019 check blood sugar twice a day   True Metrix Air Glucose Meter miscellaneous kit 03/16/2020 CHECK BLOOD SUGAR TWICE DAILY   True Metrix Glucose Test Strip miscellaneous strip 05/08/2020 TEST GLUCOSE TWICE DAILY         Allergy List  Allergen Name Date Reaction Notes   NO KNOWN DRUG ALLERGIES --  --  --        Allergies Reconciled  Family Medical History  Disease Name Relative/Age Notes   Asthma Sister/   Sister   Chronic Obstructive Pulmonary Disease Sister/   Sister   DM Type I Brother/   Brother   Arthrtis Mother/   Mother   Alcohol abuse Father/   Father   Family History Of Breast Cancer Sister/   Sister   Cerebrovascular Accident (CVA) Brother/   Brother         Social History  Finding Status Start/Stop Quantity Notes   Alcohol Current every day --/-- --  drinks alcohol, 8-14 drinks per week   Exercises regularly --  --/-- --  7 times per week   Recreational Drug Use Never --/-- --  never used   Second hand smoke exposure Unknown --/-- --  no   Tobacco Former --/-- 2 PPD former smoker, smokes 1 to 2 packs per day, currently uses other tobacco products   Uses seatbelts --  --/-- --  no         Immunizations  NameDate Admin Mfg Trade Name Lot Number Route Inj VIS Given VIS Publication   Pkaufvpoi84/01/2017 UNK Unknown TradeName [id number] NE NE 12/19/2017 08/07/2015   Comments:    [id number]/01/2017 WAL PREVNAR 13 [id number] NE NE 12/19/2017 11/05/2015   Comments:          Review of Systems  · Constitutional  o Denies  o : fatigue, fever, chills, body aches, good general health lately  · Eyes  o Denies  o : double vision, blurred  vision  · HENT  o Denies  o : headaches, vertigo  · Cardiovascular  o Admits  o : dyspnea on exertion, claudication improved, right and left near the ankles, lightheadedness (intermittent and stable)  o Denies  o : chest pain, syncope, lower extremity edema, palpitations  · Respiratory  o Denies  o : wheezing, cough  · Gastrointestinal  o Denies  o : nausea, vomiting, diarrhea, abdominal pain, blood in stools, hematochezia  · Genitourinary  o Denies  o : urgency, frequency, dysuria, urinary retention  · Integument  o Denies  o : rash  · Neurologic  o Denies  o : difficulty concentrating, memory difficulties, speech difficulties, loss of balance, stroke  · Endocrine  o Denies  o : polyuria, polydipsia, cold intolerance, heat intolerance      Vitals  Date Time BP Position Site L\R Cuff Size HR RR TEMP (F) WT  HT  BMI kg/m2 BSA m2 O2 Sat FR L/min FiO2 HC       10/30/2020 01:24 /54 Sitting    80 - R  97.3 208lbs 0oz    99 %            Physical Examination  · Constitutional  o Appearance  o : well developed, well-nourished, in no acute distress  · Neck  o Inspection/Palpation  o : normal appearance, no masses or tenderness, trachea midline  o Thyroid  o : no thyromegaly  · Respiratory  o Respiratory Effort  o : breathing unlabored  o Auscultation of Lungs  o : clear to auscultation  · Cardiovascular  o Heart  o :   § Auscultation of Heart  § : regular rate, normal rhythm, systolic murmur present, no pericardial friction rub  o Peripheral Vascular System  o :   § Carotid Arteries  § : normal pulses bilaterally, soft bruit present on the left; no bruit on the right - surgical incision is well-approximated and healing  § Pedal Pulses  § : bilateral pulse strength 1+   § Extremities  § : no edema  · Lymphatic  o Neck  o : no lymphadenopathy present  · Musculoskeletal  o General  o :   § General Musculoskeletal  § : Muscle tone, strength, and development grossly normal.  · Skin and Subcutaneous Tissue  o General  Inspection  o : no lesions present, no areas of discoloration, skin turgor normal, texture normal  · Neurologic  o Gait and Station  o :   § Gait Screening  § : normal gait  · Psychiatric  o Mood and Affect  o : mood normal, affect appropriate          Assessment  · Essential hypertension     401.9/I10  · Hyperlipidemia     272.4/E78.5  · Peripheral arterial disease     443.9/I73.9  · CAD (coronary artery disease)     414.00/I25.10      Plan  · Orders  o ACO-39: Current medications updated and reviewed (1159F, ) - - 10/30/2020  · Medications  o Medications have been Reconciled  o Transition of Care or Provider Policy  · Instructions  o Patient was educated/instructed on their diagnosis, treatment and medications prior to discharge from the clinic today. S/P CEA on 8/3 and bilateral femoral endarterectomy on 10/13 - doing well post-op, no complications, and no complaints today - Will follow with vascular next month, and plan to see me back in January -sooner if needed.   o Chronic conditions reviewed and taken into consideration for today's treatment plan.            Electronically Signed by: RUSS Jordan -Author on October 30, 2020 01:56:41 PM   - - -

## 2021-09-27 NOTE — ED ADULT TRIAGE NOTE - CCCP TRG CHIEF CMPLNT
Medication request: DULoxetine (CYMBALTA) 30 MG Oral Cap DR Particles  Sig:   Take 1 capsule (30 mg total) by mouth daily. LOV: 9/3/21  NOV: None    [place]: 9/3/21 qty#30 r-0      S/w patient states he's not taking this rx, too side effects.
chest pain

## 2022-01-29 ENCOUNTER — INPATIENT (INPATIENT)
Facility: HOSPITAL | Age: 87
LOS: 3 days | Discharge: HOME HEALTH | End: 2022-02-02
Attending: STUDENT IN AN ORGANIZED HEALTH CARE EDUCATION/TRAINING PROGRAM | Admitting: STUDENT IN AN ORGANIZED HEALTH CARE EDUCATION/TRAINING PROGRAM
Payer: MEDICARE

## 2022-01-29 VITALS
DIASTOLIC BLOOD PRESSURE: 60 MMHG | SYSTOLIC BLOOD PRESSURE: 116 MMHG | RESPIRATION RATE: 20 BRPM | HEART RATE: 63 BPM | TEMPERATURE: 99 F | HEIGHT: 60 IN | OXYGEN SATURATION: 94 %

## 2022-01-29 DIAGNOSIS — K25.5 CHRONIC OR UNSPECIFIED GASTRIC ULCER WITH PERFORATION: Chronic | ICD-10-CM

## 2022-01-29 DIAGNOSIS — Z90.49 ACQUIRED ABSENCE OF OTHER SPECIFIED PARTS OF DIGESTIVE TRACT: Chronic | ICD-10-CM

## 2022-01-29 DIAGNOSIS — Z90.710 ACQUIRED ABSENCE OF BOTH CERVIX AND UTERUS: Chronic | ICD-10-CM

## 2022-01-29 LAB
BASE EXCESS BLDV CALC-SCNC: 5.8 MMOL/L — HIGH (ref -2–3)
BASOPHILS # BLD AUTO: 0.01 K/UL — SIGNIFICANT CHANGE UP (ref 0–0.2)
BASOPHILS NFR BLD AUTO: 0.2 % — SIGNIFICANT CHANGE UP (ref 0–1)
CA-I SERPL-SCNC: 1.14 MMOL/L — LOW (ref 1.15–1.33)
D DIMER BLD IA.RAPID-MCNC: 521 NG/ML DDU — HIGH (ref 0–230)
EOSINOPHIL # BLD AUTO: 0 K/UL — SIGNIFICANT CHANGE UP (ref 0–0.7)
EOSINOPHIL NFR BLD AUTO: 0 % — SIGNIFICANT CHANGE UP (ref 0–8)
GAS PNL BLDV: 136 MMOL/L — SIGNIFICANT CHANGE UP (ref 136–145)
GAS PNL BLDV: SIGNIFICANT CHANGE UP
HCO3 BLDV-SCNC: 31 MMOL/L — HIGH (ref 22–29)
HCT VFR BLD CALC: 42 % — SIGNIFICANT CHANGE UP (ref 37–47)
HCT VFR BLDA CALC: 44 % — SIGNIFICANT CHANGE UP (ref 34.5–46.5)
HGB BLD CALC-MCNC: 14.7 G/DL — SIGNIFICANT CHANGE UP (ref 11.7–16.1)
HGB BLD-MCNC: 14.1 G/DL — SIGNIFICANT CHANGE UP (ref 12–16)
IMM GRANULOCYTES NFR BLD AUTO: 0.9 % — HIGH (ref 0.1–0.3)
LACTATE BLDV-MCNC: 1.9 MMOL/L — SIGNIFICANT CHANGE UP (ref 0.5–2)
LYMPHOCYTES # BLD AUTO: 0.85 K/UL — LOW (ref 1.2–3.4)
LYMPHOCYTES # BLD AUTO: 15.3 % — LOW (ref 20.5–51.1)
MCHC RBC-ENTMCNC: 28.6 PG — SIGNIFICANT CHANGE UP (ref 27–31)
MCHC RBC-ENTMCNC: 33.6 G/DL — SIGNIFICANT CHANGE UP (ref 32–37)
MCV RBC AUTO: 85.2 FL — SIGNIFICANT CHANGE UP (ref 81–99)
MONOCYTES # BLD AUTO: 0.67 K/UL — HIGH (ref 0.1–0.6)
MONOCYTES NFR BLD AUTO: 12 % — HIGH (ref 1.7–9.3)
NEUTROPHILS # BLD AUTO: 3.99 K/UL — SIGNIFICANT CHANGE UP (ref 1.4–6.5)
NEUTROPHILS NFR BLD AUTO: 71.6 % — SIGNIFICANT CHANGE UP (ref 42.2–75.2)
NRBC # BLD: 0 /100 WBCS — SIGNIFICANT CHANGE UP (ref 0–0)
PCO2 BLDV: 47 MMHG — HIGH (ref 39–42)
PH BLDV: 7.43 — SIGNIFICANT CHANGE UP (ref 7.32–7.43)
PLATELET # BLD AUTO: 196 K/UL — SIGNIFICANT CHANGE UP (ref 130–400)
PO2 BLDV: 13 MMHG — SIGNIFICANT CHANGE UP
POTASSIUM BLDV-SCNC: 2.9 MMOL/L — CRITICAL LOW (ref 3.5–5.1)
RBC # BLD: 4.93 M/UL — SIGNIFICANT CHANGE UP (ref 4.2–5.4)
RBC # FLD: 14 % — SIGNIFICANT CHANGE UP (ref 11.5–14.5)
SAO2 % BLDV: 17.1 % — SIGNIFICANT CHANGE UP
WBC # BLD: 5.57 K/UL — SIGNIFICANT CHANGE UP (ref 4.8–10.8)
WBC # FLD AUTO: 5.57 K/UL — SIGNIFICANT CHANGE UP (ref 4.8–10.8)

## 2022-01-29 PROCEDURE — 71045 X-RAY EXAM CHEST 1 VIEW: CPT | Mod: 26

## 2022-01-29 PROCEDURE — 99285 EMERGENCY DEPT VISIT HI MDM: CPT

## 2022-01-29 NOTE — ED ADULT TRIAGE NOTE - CHIEF COMPLAINT QUOTE
Pt BIBA c/o SOB worsening tonight, sat 88% on home O2 at 3L. Pt COVID+   Pt also c/o vomiting, diarrhea and weakness

## 2022-01-30 LAB
ALBUMIN SERPL ELPH-MCNC: 3.4 G/DL — LOW (ref 3.5–5.2)
ALBUMIN SERPL ELPH-MCNC: 3.6 G/DL — SIGNIFICANT CHANGE UP (ref 3.5–5.2)
ALP SERPL-CCNC: 81 U/L — SIGNIFICANT CHANGE UP (ref 30–115)
ALP SERPL-CCNC: 82 U/L — SIGNIFICANT CHANGE UP (ref 30–115)
ALT FLD-CCNC: 16 U/L — SIGNIFICANT CHANGE UP (ref 0–41)
ALT FLD-CCNC: 17 U/L — SIGNIFICANT CHANGE UP (ref 0–41)
ANION GAP SERPL CALC-SCNC: 13 MMOL/L — SIGNIFICANT CHANGE UP (ref 7–14)
ANION GAP SERPL CALC-SCNC: 16 MMOL/L — HIGH (ref 7–14)
ANION GAP SERPL CALC-SCNC: 19 MMOL/L — HIGH (ref 7–14)
AST SERPL-CCNC: 27 U/L — SIGNIFICANT CHANGE UP (ref 0–41)
AST SERPL-CCNC: 31 U/L — SIGNIFICANT CHANGE UP (ref 0–41)
BILIRUB SERPL-MCNC: 0.5 MG/DL — SIGNIFICANT CHANGE UP (ref 0.2–1.2)
BILIRUB SERPL-MCNC: 0.6 MG/DL — SIGNIFICANT CHANGE UP (ref 0.2–1.2)
BUN SERPL-MCNC: 19 MG/DL — SIGNIFICANT CHANGE UP (ref 10–20)
BUN SERPL-MCNC: 21 MG/DL — HIGH (ref 10–20)
BUN SERPL-MCNC: 25 MG/DL — HIGH (ref 10–20)
CALCIUM SERPL-MCNC: 8 MG/DL — LOW (ref 8.5–10.1)
CALCIUM SERPL-MCNC: 8.2 MG/DL — LOW (ref 8.5–10.1)
CALCIUM SERPL-MCNC: 8.2 MG/DL — LOW (ref 8.5–10.1)
CHLORIDE SERPL-SCNC: 102 MMOL/L — SIGNIFICANT CHANGE UP (ref 98–110)
CHLORIDE SERPL-SCNC: 102 MMOL/L — SIGNIFICANT CHANGE UP (ref 98–110)
CHLORIDE SERPL-SCNC: 105 MMOL/L — SIGNIFICANT CHANGE UP (ref 98–110)
CO2 SERPL-SCNC: 20 MMOL/L — SIGNIFICANT CHANGE UP (ref 17–32)
CO2 SERPL-SCNC: 20 MMOL/L — SIGNIFICANT CHANGE UP (ref 17–32)
CO2 SERPL-SCNC: 21 MMOL/L — SIGNIFICANT CHANGE UP (ref 17–32)
CREAT SERPL-MCNC: 0.7 MG/DL — SIGNIFICANT CHANGE UP (ref 0.7–1.5)
CREAT SERPL-MCNC: 0.7 MG/DL — SIGNIFICANT CHANGE UP (ref 0.7–1.5)
CREAT SERPL-MCNC: 0.9 MG/DL — SIGNIFICANT CHANGE UP (ref 0.7–1.5)
GLUCOSE SERPL-MCNC: 176 MG/DL — HIGH (ref 70–99)
GLUCOSE SERPL-MCNC: 225 MG/DL — HIGH (ref 70–99)
GLUCOSE SERPL-MCNC: 296 MG/DL — HIGH (ref 70–99)
HCT VFR BLD CALC: 40.9 % — SIGNIFICANT CHANGE UP (ref 37–47)
HGB BLD-MCNC: 13.9 G/DL — SIGNIFICANT CHANGE UP (ref 12–16)
MCHC RBC-ENTMCNC: 29 PG — SIGNIFICANT CHANGE UP (ref 27–31)
MCHC RBC-ENTMCNC: 34 G/DL — SIGNIFICANT CHANGE UP (ref 32–37)
MCV RBC AUTO: 85.2 FL — SIGNIFICANT CHANGE UP (ref 81–99)
NRBC # BLD: 0 /100 WBCS — SIGNIFICANT CHANGE UP (ref 0–0)
PLATELET # BLD AUTO: 192 K/UL — SIGNIFICANT CHANGE UP (ref 130–400)
POTASSIUM SERPL-MCNC: 3.1 MMOL/L — LOW (ref 3.5–5)
POTASSIUM SERPL-MCNC: 3.4 MMOL/L — LOW (ref 3.5–5)
POTASSIUM SERPL-MCNC: 4.4 MMOL/L — SIGNIFICANT CHANGE UP (ref 3.5–5)
POTASSIUM SERPL-SCNC: 3.1 MMOL/L — LOW (ref 3.5–5)
POTASSIUM SERPL-SCNC: 3.4 MMOL/L — LOW (ref 3.5–5)
POTASSIUM SERPL-SCNC: 4.4 MMOL/L — SIGNIFICANT CHANGE UP (ref 3.5–5)
PROT SERPL-MCNC: 6.1 G/DL — SIGNIFICANT CHANGE UP (ref 6–8)
PROT SERPL-MCNC: 6.4 G/DL — SIGNIFICANT CHANGE UP (ref 6–8)
RAPID RVP RESULT: DETECTED
RBC # BLD: 4.8 M/UL — SIGNIFICANT CHANGE UP (ref 4.2–5.4)
RBC # FLD: 14.1 % — SIGNIFICANT CHANGE UP (ref 11.5–14.5)
SARS-COV-2 RNA SPEC QL NAA+PROBE: DETECTED
SODIUM SERPL-SCNC: 136 MMOL/L — SIGNIFICANT CHANGE UP (ref 135–146)
SODIUM SERPL-SCNC: 141 MMOL/L — SIGNIFICANT CHANGE UP (ref 135–146)
SODIUM SERPL-SCNC: 141 MMOL/L — SIGNIFICANT CHANGE UP (ref 135–146)
WBC # BLD: 3.23 K/UL — LOW (ref 4.8–10.8)
WBC # FLD AUTO: 3.23 K/UL — LOW (ref 4.8–10.8)

## 2022-01-30 PROCEDURE — 99223 1ST HOSP IP/OBS HIGH 75: CPT

## 2022-01-30 PROCEDURE — 93010 ELECTROCARDIOGRAM REPORT: CPT

## 2022-01-30 RX ORDER — CHLORHEXIDINE GLUCONATE 213 G/1000ML
1 SOLUTION TOPICAL
Refills: 0 | Status: DISCONTINUED | OUTPATIENT
Start: 2022-01-30 | End: 2022-02-02

## 2022-01-30 RX ORDER — IPRATROPIUM/ALBUTEROL SULFATE 18-103MCG
3 AEROSOL WITH ADAPTER (GRAM) INHALATION EVERY 6 HOURS
Refills: 0 | Status: DISCONTINUED | OUTPATIENT
Start: 2022-01-30 | End: 2022-02-02

## 2022-01-30 RX ORDER — REMDESIVIR 5 MG/ML
INJECTION INTRAVENOUS
Refills: 0 | Status: DISCONTINUED | OUTPATIENT
Start: 2022-01-30 | End: 2022-02-02

## 2022-01-30 RX ORDER — DEXAMETHASONE 0.5 MG/5ML
6 ELIXIR ORAL ONCE
Refills: 0 | Status: COMPLETED | OUTPATIENT
Start: 2022-01-30 | End: 2022-01-30

## 2022-01-30 RX ORDER — AMLODIPINE BESYLATE 2.5 MG/1
5 TABLET ORAL DAILY
Refills: 0 | Status: DISCONTINUED | OUTPATIENT
Start: 2022-01-30 | End: 2022-02-02

## 2022-01-30 RX ORDER — ENOXAPARIN SODIUM 100 MG/ML
40 INJECTION SUBCUTANEOUS DAILY
Refills: 0 | Status: DISCONTINUED | OUTPATIENT
Start: 2022-01-30 | End: 2022-02-02

## 2022-01-30 RX ORDER — DEXAMETHASONE 0.5 MG/5ML
6 ELIXIR ORAL DAILY
Refills: 0 | Status: DISCONTINUED | OUTPATIENT
Start: 2022-01-30 | End: 2022-02-02

## 2022-01-30 RX ORDER — POTASSIUM CHLORIDE 20 MEQ
20 PACKET (EA) ORAL
Refills: 0 | Status: COMPLETED | OUTPATIENT
Start: 2022-01-30 | End: 2022-01-30

## 2022-01-30 RX ORDER — NYSTATIN CREAM 100000 [USP'U]/G
1 CREAM TOPICAL
Qty: 0 | Refills: 0 | DISCHARGE

## 2022-01-30 RX ORDER — ATORVASTATIN CALCIUM 80 MG/1
1 TABLET, FILM COATED ORAL
Qty: 0 | Refills: 0 | DISCHARGE

## 2022-01-30 RX ORDER — SODIUM CHLORIDE 9 MG/ML
1000 INJECTION, SOLUTION INTRAVENOUS ONCE
Refills: 0 | Status: COMPLETED | OUTPATIENT
Start: 2022-01-30 | End: 2022-01-30

## 2022-01-30 RX ORDER — LEVOTHYROXINE SODIUM 125 MCG
50 TABLET ORAL DAILY
Refills: 0 | Status: DISCONTINUED | OUTPATIENT
Start: 2022-01-30 | End: 2022-02-02

## 2022-01-30 RX ORDER — ATENOLOL 25 MG/1
25 TABLET ORAL DAILY
Refills: 0 | Status: DISCONTINUED | OUTPATIENT
Start: 2022-01-30 | End: 2022-02-02

## 2022-01-30 RX ORDER — LEVOTHYROXINE SODIUM 125 MCG
1 TABLET ORAL
Qty: 0 | Refills: 0 | DISCHARGE

## 2022-01-30 RX ORDER — REMDESIVIR 5 MG/ML
100 INJECTION INTRAVENOUS EVERY 24 HOURS
Refills: 0 | Status: DISCONTINUED | OUTPATIENT
Start: 2022-01-31 | End: 2022-02-02

## 2022-01-30 RX ORDER — REMDESIVIR 5 MG/ML
200 INJECTION INTRAVENOUS EVERY 24 HOURS
Refills: 0 | Status: COMPLETED | OUTPATIENT
Start: 2022-01-30 | End: 2022-01-30

## 2022-01-30 RX ORDER — MELOXICAM 15 MG/1
1 TABLET ORAL
Qty: 0 | Refills: 0 | DISCHARGE

## 2022-01-30 RX ORDER — POTASSIUM CHLORIDE 20 MEQ
40 PACKET (EA) ORAL ONCE
Refills: 0 | Status: COMPLETED | OUTPATIENT
Start: 2022-01-30 | End: 2022-01-30

## 2022-01-30 RX ADMIN — Medication 6 MILLIGRAM(S): at 17:34

## 2022-01-30 RX ADMIN — ENOXAPARIN SODIUM 40 MILLIGRAM(S): 100 INJECTION SUBCUTANEOUS at 15:28

## 2022-01-30 RX ADMIN — Medication 50 MILLIEQUIVALENT(S): at 19:51

## 2022-01-30 RX ADMIN — SODIUM CHLORIDE 333.33 MILLILITER(S): 9 INJECTION, SOLUTION INTRAVENOUS at 07:09

## 2022-01-30 RX ADMIN — Medication 40 MILLIEQUIVALENT(S): at 01:58

## 2022-01-30 RX ADMIN — Medication 50 MICROGRAM(S): at 17:34

## 2022-01-30 RX ADMIN — Medication 50 MILLIEQUIVALENT(S): at 15:36

## 2022-01-30 RX ADMIN — Medication 50 MILLIEQUIVALENT(S): at 17:40

## 2022-01-30 RX ADMIN — ATENOLOL 25 MILLIGRAM(S): 25 TABLET ORAL at 17:34

## 2022-01-30 RX ADMIN — Medication 6 MILLIGRAM(S): at 07:09

## 2022-01-30 RX ADMIN — REMDESIVIR 500 MILLIGRAM(S): 5 INJECTION INTRAVENOUS at 15:34

## 2022-01-30 NOTE — ED PROVIDER NOTE - PHYSICAL EXAMINATION
CONSTITUTIONAL: Well-developed; well-nourished; in no acute distress.   SKIN: warm, dry  HEAD: Normocephalic; atraumatic.  EYES: PERRL, EOMI, normal sclera and conjunctiva   ENT: No nasal discharge; airway clear.  NECK: Supple; non tender.  CARD:  Regular rate and rhythm.   RESP: tachypneic but able to speak in full sentences, lungs CTAB  ABD: soft ntnd  EXT: Normal ROM.  no LE edema no unilateral leg swelling  NEURO: Alert, oriented, grossly unremarkable  PSYCH: Cooperative, appropriate.

## 2022-01-30 NOTE — H&P ADULT - HISTORY OF PRESENT ILLNESS
89yo F w/ PMHx of ?COPD home 3L, Hypothyroidism with thyroid nodules, HTN, Hysterectomy, Laparoscopic Cholecystectomy, and Perforated gastric ulcer presenting w/ SOB , associated w/ diarrhea and mild cough productive of white sputum. Daughter reports she did a at home covid test which was positive. She noted today pt desaturated to 80's on  her baseline 3L. denies any fevers. no unilateral leg swelling.    ED course: Was 94% on 6L then improved to 98% on 3L (home dose) after dexamethasone. Will be admitted for closer monitoring. 89yo F w/ PMHx of ?COPD home 3L, Hypothyroidism with thyroid nodules, HTN, Hysterectomy, Laparoscopic Cholecystectomy, and Perforated gastric ulcer presenting w/ SOB , associated w/ diarrhea, fatigue and mild cough productive of white sputum. Symptoms started on 1/25. Daughter reports she did a at home covid test which was positive. She noted today pt desaturated to 80's on  her baseline 3L. denies any fevers. no unilateral leg swelling. SHe is not vaccinated.    ED course: Was 94% on 6L then improved to 98% on 3L (home dose) after dexamethasone. Will be admitted for closer monitoring.

## 2022-01-30 NOTE — CONSULT NOTE ADULT - SUBJECTIVE AND OBJECTIVE BOX
HIEN SANCHEZ  89y, Female  Allergy: No Known Allergies      CHIEF COMPLAINT: COVID (30 Jan 2022 10:47)      LOS      HPI:  89yo F w/ PMHx of ?COPD home 3L, Hypothyroidism with thyroid nodules, HTN, Hysterectomy, Laparoscopic Cholecystectomy, and Perforated gastric ulcer presenting w/ SOB , associated w/ diarrhea, fatigue and mild cough productive of white sputum. Symptoms started on 1/25. Daughter reports she did a at home covid test which was positive. She noted today pt desaturated to 80's on  her baseline 3L. denies any fevers. no unilateral leg swelling. SHe is not vaccinated.    ED course: Was 94% on 6L then improved to 98% on 3L (home dose) after dexamethasone. Will be admitted for closer monitoring. (30 Jan 2022 10:47)      INFECTIOUS DISEASE HISTORY:  History as above.     PAST MEDICAL & SURGICAL HISTORY:  High blood cholesterol    Hypertension    Peptic ulcer    DM (diabetes mellitus)    Obesity    History of cholecystectomy    H/O abdominal hysterectomy    Perforated chronic gastric ulcer        FAMILY HISTORY      SOCIAL HISTORY  Social History:  Remote history of smoking  Lives at home alone (30 Jan 2022 10:47)        ROS  General: Denies rigors, nightsweats  HEENT: Denies headache, rhinorrhea, sore throat, eye pain  CV: Denies CP, palpitations  PULM: Denies wheezing, hemoptysis  GI: Denies hematemesis, hematochezia, melena  : Denies discharge, hematuria  MSK: Denies arthralgias, myalgias  SKIN: Denies rash, lesions  NEURO: Denies paresthesias, weakness  PSYCH: Denies depression, anxiety    VITALS:  T(F): 97.5, Max: 98.9 (01-29-22 @ 21:57)  HR: 75  BP: 140/67  RR: 17Vital Signs Last 24 Hrs  T(C): 36.4 (30 Jan 2022 10:36), Max: 37.2 (29 Jan 2022 21:57)  T(F): 97.5 (30 Jan 2022 10:36), Max: 98.9 (29 Jan 2022 21:57)  HR: 75 (30 Jan 2022 10:36) (62 - 75)  BP: 140/67 (30 Jan 2022 10:36) (116/60 - 140/67)  BP(mean): --  RR: 17 (30 Jan 2022 10:36) (17 - 20)  SpO2: 98% (30 Jan 2022 10:36) (94% - 98%)    PHYSICAL EXAM:  Gen: NAD, resting in bed  HEENT: Normocephalic, atraumatic  Neck: supple, no lymphadenopathy  CV: Regular rate & regular rhythm  Lungs: decreased BS at bases, no fremitus  Abdomen: Soft, BS present  Ext: Warm, well perfused  Neuro: non focal, awake  Skin: no rash, no erythema  Lines: no phlebitis    TESTS & MEASUREMENTS:                        13.9   3.23  )-----------( 192      ( 30 Jan 2022 11:00 )             40.9     01-30    141  |  105  |  19  ----------------------------<  225<H>  4.4   |  20  |  0.7    Ca    8.2<L>      30 Jan 2022 11:00    TPro  6.1  /  Alb  3.4<L>  /  TBili  0.5  /  DBili  x   /  AST  27  /  ALT  16  /  AlkPhos  82  01-30    eGFR if Non African American: 74 mL/min/1.73M2 (01-30-22 @ 11:00)  eGFR if : 86 mL/min/1.73M2 (01-30-22 @ 11:00)  eGFR if Non African American: 55 mL/min/1.73M2 (01-29-22 @ 23:17)  eGFR if : 63 mL/min/1.73M2 (01-29-22 @ 23:17)    LIVER FUNCTIONS - ( 30 Jan 2022 11:00 )  Alb: 3.4 g/dL / Pro: 6.1 g/dL / ALK PHOS: 82 U/L / ALT: 16 U/L / AST: 27 U/L / GGT: x                 Blood Gas Venous - Lactate: 1.90 mmol/L (01-29-22 @ 23:41)      INFECTIOUS DISEASES TESTING      RADIOLOGY & ADDITIONAL TESTS:  I have personally reviewed the last Chest xray  CXR  Xray Chest 1 View- PORTABLE-Urgent:   ACC: [id number] EXAM:  XR CHEST PORTABLE URGENT 1V                          PROCEDURE DATE:  01/29/2022          INTERPRETATION:  Clinical History / Reason for exam: Shortness of breath,   cough and fever    Comparison : Chest radiograph: 10/15/2020    Technique/Positioning: Frontal view of the chest    Findings:    Support devices: Telemetry leads.    Cardiac/mediastinum/hilum: No significant change    Lung parenchyma/Pleura: Left lower lobe consolidation. Hazy opacities in   the right lower lung zone. No pneumothorax.    Skeleton/soft tissues: Degenerative changes of both shoulders.        Impression:      Bilateral opacities    --- End of Report ---            BREEZY WHEELER MD; Attending Radiologist  This document has been electronically signed. Jan 30 2022  7:18AM (01-29-22 @ 23:13)      CT      CARDIOLOGY TESTING      MEDICATIONS  albuterol/ipratropium for Nebulization 3 Nebulizer every 6 hours  amLODIPine   Tablet 5 Oral daily  ATENolol  Tablet 25 Oral daily  chlorhexidine 4% Liquid 1 Topical <User Schedule>  dexAMETHasone  Injectable 6 IV Push daily  enoxaparin Injectable 40 SubCutaneous daily  levothyroxine 50 Oral daily  potassium chloride  20 mEq/100 mL IVPB 20 IV Intermittent every 2 hours  remdesivir  IVPB  IV Intermittent   remdesivir  IVPB 200 IV Intermittent every 24 hours      Weight      ANTIBIOTICS:  remdesivir  IVPB   IV Intermittent   remdesivir  IVPB 200 milliGRAM(s) IV Intermittent every 24 hours      ALLERGIES:  No Known Allergies       HIEN SANCHEZ  89y, Female  Allergy: No Known Allergies      CHIEF COMPLAINT: COVID (30 Jan 2022 10:47)      LOS      HPI:  89yo F w/ PMHx of ?COPD home 3L, Hypothyroidism with thyroid nodules, HTN, Hysterectomy, Laparoscopic Cholecystectomy, and Perforated gastric ulcer presenting w/ SOB , associated w/ diarrhea, fatigue and mild cough productive of white sputum. Symptoms started on 1/25. Daughter reports she did a at home covid test which was positive. She noted today pt desaturated to 80's on  her baseline 3L. denies any fevers. no unilateral leg swelling. SHe is not vaccinated.    ED course: Was 94% on 6L then improved to 98% on 3L (home dose) after dexamethasone. Will be admitted for closer monitoring. (30 Jan 2022 10:47)      INFECTIOUS DISEASE HISTORY:  History as above.   Difficult historian. She denies any worsening shortness of breath, nausea, vomiting, abdominal pain, chest pain.   Current back on 3L NC.     PAST MEDICAL & SURGICAL HISTORY:  High blood cholesterol    Hypertension    Peptic ulcer    DM (diabetes mellitus)    Obesity    History of cholecystectomy    H/O abdominal hysterectomy    Perforated chronic gastric ulcer        FAMILY HISTORY  Family hx reviewed and non-contributory     SOCIAL HISTORY  Social History:  Remote history of smoking  Lives at home alone (30 Jan 2022 10:47)        ROS  General: Denies rigors, nightsweats  HEENT: Denies headache, rhinorrhea, sore throat, eye pain  CV: Denies CP, palpitations  PULM: Denies wheezing, hemoptysis  GI: Denies hematemesis, hematochezia, melena  : Denies discharge, hematuria  MSK: Denies arthralgias, myalgias  SKIN: Denies rash, lesions  NEURO: Denies paresthesias, weakness  PSYCH: Denies depression, anxiety    VITALS:  T(F): 97.5, Max: 98.9 (01-29-22 @ 21:57)  HR: 75  BP: 140/67  RR: 17Vital Signs Last 24 Hrs  T(C): 36.4 (30 Jan 2022 10:36), Max: 37.2 (29 Jan 2022 21:57)  T(F): 97.5 (30 Jan 2022 10:36), Max: 98.9 (29 Jan 2022 21:57)  HR: 75 (30 Jan 2022 10:36) (62 - 75)  BP: 140/67 (30 Jan 2022 10:36) (116/60 - 140/67)  BP(mean): --  RR: 17 (30 Jan 2022 10:36) (17 - 20)  SpO2: 98% (30 Jan 2022 10:36) (94% - 98%)    PHYSICAL EXAM:  Gen: NAD, resting in bed  HEENT: Normocephalic, atraumatic  Neck: supple, no lymphadenopathy  CV: Regular rate & regular rhythm  Lungs: decreased BS at bases, no fremitus  Abdomen: Soft, BS present  Ext: Warm, well perfused  Neuro: non focal, awake  Skin: no rash, no erythema  Lines: no phlebitis    TESTS & MEASUREMENTS:                        13.9   3.23  )-----------( 192      ( 30 Jan 2022 11:00 )             40.9     01-30    141  |  105  |  19  ----------------------------<  225<H>  4.4   |  20  |  0.7    Ca    8.2<L>      30 Jan 2022 11:00    TPro  6.1  /  Alb  3.4<L>  /  TBili  0.5  /  DBili  x   /  AST  27  /  ALT  16  /  AlkPhos  82  01-30    eGFR if Non African American: 74 mL/min/1.73M2 (01-30-22 @ 11:00)  eGFR if : 86 mL/min/1.73M2 (01-30-22 @ 11:00)  eGFR if Non African American: 55 mL/min/1.73M2 (01-29-22 @ 23:17)  eGFR if : 63 mL/min/1.73M2 (01-29-22 @ 23:17)    LIVER FUNCTIONS - ( 30 Jan 2022 11:00 )  Alb: 3.4 g/dL / Pro: 6.1 g/dL / ALK PHOS: 82 U/L / ALT: 16 U/L / AST: 27 U/L / GGT: x                 Blood Gas Venous - Lactate: 1.90 mmol/L (01-29-22 @ 23:41)      INFECTIOUS DISEASES TESTING      RADIOLOGY & ADDITIONAL TESTS:  I have personally reviewed the last Chest xray  CXR  Xray Chest 1 View- PORTABLE-Urgent:   ACC: [id number] EXAM:  XR CHEST PORTABLE URGENT 1V                          PROCEDURE DATE:  01/29/2022          INTERPRETATION:  Clinical History / Reason for exam: Shortness of breath,   cough and fever    Comparison : Chest radiograph: 10/15/2020    Technique/Positioning: Frontal view of the chest    Findings:    Support devices: Telemetry leads.    Cardiac/mediastinum/hilum: No significant change    Lung parenchyma/Pleura: Left lower lobe consolidation. Hazy opacities in   the right lower lung zone. No pneumothorax.    Skeleton/soft tissues: Degenerative changes of both shoulders.        Impression:      Bilateral opacities    --- End of Report ---            BREEZY WHEELER MD; Attending Radiologist  This document has been electronically signed. Jan 30 2022  7:18AM (01-29-22 @ 23:13)      CT      CARDIOLOGY TESTING      MEDICATIONS  albuterol/ipratropium for Nebulization 3 Nebulizer every 6 hours  amLODIPine   Tablet 5 Oral daily  ATENolol  Tablet 25 Oral daily  chlorhexidine 4% Liquid 1 Topical <User Schedule>  dexAMETHasone  Injectable 6 IV Push daily  enoxaparin Injectable 40 SubCutaneous daily  levothyroxine 50 Oral daily  potassium chloride  20 mEq/100 mL IVPB 20 IV Intermittent every 2 hours  remdesivir  IVPB  IV Intermittent   remdesivir  IVPB 200 IV Intermittent every 24 hours      Weight      ANTIBIOTICS:  remdesivir  IVPB   IV Intermittent   remdesivir  IVPB 200 milliGRAM(s) IV Intermittent every 24 hours      ALLERGIES:  No Known Allergies

## 2022-01-30 NOTE — ED PROVIDER NOTE - CARE PLAN
Principal Discharge DX:	SOB (shortness of breath)  Secondary Diagnosis:	Hypokalemia   1 Principal Discharge DX:	SOB (shortness of breath)  Secondary Diagnosis:	Hypokalemia  Secondary Diagnosis:	COVID   Principal Discharge DX:	COVID  Secondary Diagnosis:	Hypokalemia

## 2022-01-30 NOTE — ED PROVIDER NOTE - ATTENDING CONTRIBUTION TO CARE
89yF p/w SOB and hypoxia - pt on home O2 since fall w/ sternal/rib fx in 2020 - now w/ worsening SOB and O2 sat 88% at home on her usual 2L.  No fevers.  Pt recently tested covid+.  Admits to weakness and n/v/d but no CP or cough.

## 2022-01-30 NOTE — H&P ADULT - NSHPLABSRESULTS_GEN_ALL_CORE
LABS:  cret                        14.1   5.57  )-----------( 196      ( 29 Jan 2022 23:17 )             42.0     01-29    141  |  102  |  25<H>  ----------------------------<  176<H>  3.1<L>   |  20  |  0.9    Ca    8.2<L>      29 Jan 2022 23:17    TPro  6.4  /  Alb  3.6  /  TBili  0.6  /  DBili  x   /  AST  31  /  ALT  17  /  AlkPhos  81  01-29

## 2022-01-30 NOTE — PATIENT PROFILE ADULT - ABILITY TO HEAR (WITH HEARING AID OR HEARING APPLIANCE IF NORMALLY USED):
pt has one hearing aid in and stated she lost the other/Mildly to Moderately Impaired: difficulty hearing in some environments or speaker may need to increase volume or speak distinctly

## 2022-01-30 NOTE — H&P ADULT - ATTENDING COMMENTS
ROS and physical exam: as above    A/P    89yo F w/ PMHx of ?COPD home 3L, Hypothyroidism with thyroid nodules, HTN, Hysterectomy, Laparoscopic Cholecystectomy, and Perforated gastric ulcer presenting for COVID.    # Acute hypoxic Resp faluire 2/2 COVID 19 PNA:  # chronic hypoxemic respiratory failure 2/2 underlying COPD: on 3 L, at baseline  - Not septic on admission  - intially on 6 L NC, now on 3L 98%  - D-dimer 521 (negative), other inflammatory markers pending  - Start remdesivir  - Dexamethasone  - ID consult  - Titrate O2 to oxygen >88%  - Duoneb treatments    # Hypothyroidism- Continue synthroid    # Hypokalemia- Replete    # H/o HTN- Continue atenolol and amlodipine    Rest as above

## 2022-01-30 NOTE — CONSULT NOTE ADULT - ASSESSMENT
ASSESSMENT  89yo F w/ PMHx of ?COPD home 3L, Hypothyroidism with thyroid nodules, HTN, Hysterectomy, Laparoscopic Cholecystectomy, and Perforated gastric ulcer presenting w/ SOB , associated w/ diarrhea, fatigue and mild cough productive of white sputum. Symptoms started on 1/25. Daughter reports she did a at home covid test which was positive. She noted today pt desaturated to 80's on  her baseline 3L. denies any fevers. no unilateral leg swelling. SHe is not vaccinated.    IMPRESSION  #COVID-19   - severe, unvaccinated  - CXR on admission with bilateral opacities  - D-Dimer Assay, Quantitative: 521: Manufacturers recommended Cut off for VTE is 230 ng/ml D-DU ng/mL DDU (01.29.22 @ 23:17)    #COPD on 3L NC  #Abx allergy: NKDA    RECOMMENDATIONS  This is a preliminary incomplete pended note, all final recommendations to follow after interview and examination of the patient.    Please call or message on [employer] Teams if with any questions.  Spectra [phone]     ASSESSMENT  89yo F w/ PMHx of ?COPD home 3L, Hypothyroidism with thyroid nodules, HTN, Hysterectomy, Laparoscopic Cholecystectomy, and Perforated gastric ulcer presenting w/ SOB , associated w/ diarrhea, fatigue and mild cough productive of white sputum. Symptoms started on 1/25. Daughter reports she did a at home covid test which was positive. She noted today pt desaturated to 80's on  her baseline 3L. denies any fevers. no unilateral leg swelling. SHe is not vaccinated.    IMPRESSION  #COVID-19   - severe, unvaccinated  - CXR on admission with bilateral opacities  - D-Dimer Assay, Quantitative: 521: Manufacturers recommended Cut off for VTE is 230 ng/ml D-DU ng/mL DDU (01.29.22 @ 23:17)    #COPD on 3L NC  #Abx allergy: NKDA    RECOMMENDATIONS  - on baseline NC, but was requiring additional O2 on admission   - continue RDV for 5 day course  - dex 6 mg for 10 days   - follow-up rest of inflammatory markers  - monitor off antibiotics   - please call if with worsening O2 requirements    Please call or message on [employer] Teams if with any questions.  Spectra [phone]

## 2022-01-30 NOTE — H&P ADULT - NSHPPHYSICALEXAM_GEN_ALL_CORE
Vital Signs Last 24 Hrs  T(C): 36.9 (30 Jan 2022 07:32), Max: 37.2 (29 Jan 2022 21:57)  T(F): 98.5 (30 Jan 2022 07:32), Max: 98.9 (29 Jan 2022 21:57)  HR: 62 (30 Jan 2022 07:32) (62 - 67)  BP: 125/59 (30 Jan 2022 07:32) (116/60 - 125/59)  BP(mean): --  RR: 17 (30 Jan 2022 07:32) (17 - 20)  SpO2: 98% (30 Jan 2022 07:32) (94% - 98%)      VITALS:     GENERAL: NAD, lying in bed comfortably  HEAD:  Atraumatic, Normocephalic  EYES: EOMI, PERRLA, conjunctiva and sclera clear  ENT: Dry mucous membranes  NECK: Supple, No JVD  CHEST/LUNG: Decreased breath sounds b/l  HEART: Regular rate and rhythm; No murmurs, rubs, or gallops  ABDOMEN: Bowel sounds present; Soft, Nontender, Nondistended  EXTREMITIES: No clubbing, cyanosis, or edema  NERVOUS SYSTEM:  Alert & Oriented X3, speech clear. No deficits   MSK: FROM all 4 extremities, full and equal strength

## 2022-01-30 NOTE — GOALS OF CARE CONVERSATION - ADVANCED CARE PLANNING - CONVERSATION DETAILS
Explained to daughter that now patient is stable and in moderate illness, but given age, comorbidities and vaccine status is very high risk for disease to get more severe. She said she will discuss code status with her family and get back to us.

## 2022-01-30 NOTE — ED PROVIDER NOTE - OBJECTIVE STATEMENT
Pt is a 89yo F w/ PMHx of ?COPD home 3L, Hypothyroidism with thyroid nodules, HTN, Hysterectomy, Laparoscopic Cholecystectomy, and Perforated gastric ulcer presenting w/ SOB , associated w/ diarrhea and mild cough productive of white sputum. Daughter reports she did a at home covid test which was positive. She noted today pt desaturated to 80's on  her baseline 3L . denies any fevers. no unilateral leg swelling.

## 2022-01-30 NOTE — ED PROVIDER NOTE - CLINICAL SUMMARY MEDICAL DECISION MAKING FREE TEXT BOX
89yF p/w SOB and hypoxia and n/v/d in the setting of known covid.  Pt well appearing w/ only transient hypoxia at home, now stable on 2-3L O2.  Labs with covid+ and sig hypokalemia (K+ <3) but no sig BEATRICE.  K+ repleted.  CXR w/o severe PNA or ptx.  Given advanced age, comorbidities and social factors (lives alone, hard of hearing, ?cognitive decline), will adm to medicine for further care, despite stabilization from earlier hypoxia w/o sig intervention.

## 2022-01-30 NOTE — PATIENT PROFILE ADULT - FALL HARM RISK - RISK INTERVENTIONS

## 2022-01-31 LAB
ALBUMIN SERPL ELPH-MCNC: 3.4 G/DL — LOW (ref 3.5–5.2)
ALP SERPL-CCNC: 80 U/L — SIGNIFICANT CHANGE UP (ref 30–115)
ALT FLD-CCNC: 16 U/L — SIGNIFICANT CHANGE UP (ref 0–41)
ANION GAP SERPL CALC-SCNC: 19 MMOL/L — HIGH (ref 7–14)
AST SERPL-CCNC: 26 U/L — SIGNIFICANT CHANGE UP (ref 0–41)
BASOPHILS # BLD AUTO: 0.01 K/UL — SIGNIFICANT CHANGE UP (ref 0–0.2)
BASOPHILS NFR BLD AUTO: 0.2 % — SIGNIFICANT CHANGE UP (ref 0–1)
BILIRUB SERPL-MCNC: 0.5 MG/DL — SIGNIFICANT CHANGE UP (ref 0.2–1.2)
BUN SERPL-MCNC: 22 MG/DL — HIGH (ref 10–20)
CALCIUM SERPL-MCNC: 8.2 MG/DL — LOW (ref 8.5–10.1)
CHLORIDE SERPL-SCNC: 106 MMOL/L — SIGNIFICANT CHANGE UP (ref 98–110)
CO2 SERPL-SCNC: 17 MMOL/L — SIGNIFICANT CHANGE UP (ref 17–32)
CREAT SERPL-MCNC: 0.7 MG/DL — SIGNIFICANT CHANGE UP (ref 0.7–1.5)
CRP SERPL-MCNC: 27 MG/L — HIGH
EOSINOPHIL # BLD AUTO: 0 K/UL — SIGNIFICANT CHANGE UP (ref 0–0.7)
EOSINOPHIL NFR BLD AUTO: 0 % — SIGNIFICANT CHANGE UP (ref 0–8)
FERRITIN SERPL-MCNC: 392 NG/ML — HIGH (ref 15–150)
GLUCOSE SERPL-MCNC: 290 MG/DL — HIGH (ref 70–99)
HCT VFR BLD CALC: 38.9 % — SIGNIFICANT CHANGE UP (ref 37–47)
HGB BLD-MCNC: 13.1 G/DL — SIGNIFICANT CHANGE UP (ref 12–16)
IMM GRANULOCYTES NFR BLD AUTO: 0.8 % — HIGH (ref 0.1–0.3)
LYMPHOCYTES # BLD AUTO: 0.55 K/UL — LOW (ref 1.2–3.4)
LYMPHOCYTES # BLD AUTO: 10.6 % — LOW (ref 20.5–51.1)
MAGNESIUM SERPL-MCNC: 2 MG/DL — SIGNIFICANT CHANGE UP (ref 1.8–2.4)
MCHC RBC-ENTMCNC: 29.1 PG — SIGNIFICANT CHANGE UP (ref 27–31)
MCHC RBC-ENTMCNC: 33.7 G/DL — SIGNIFICANT CHANGE UP (ref 32–37)
MCV RBC AUTO: 86.4 FL — SIGNIFICANT CHANGE UP (ref 81–99)
MONOCYTES # BLD AUTO: 0.29 K/UL — SIGNIFICANT CHANGE UP (ref 0.1–0.6)
MONOCYTES NFR BLD AUTO: 5.6 % — SIGNIFICANT CHANGE UP (ref 1.7–9.3)
NEUTROPHILS # BLD AUTO: 4.29 K/UL — SIGNIFICANT CHANGE UP (ref 1.4–6.5)
NEUTROPHILS NFR BLD AUTO: 82.8 % — HIGH (ref 42.2–75.2)
NRBC # BLD: 0 /100 WBCS — SIGNIFICANT CHANGE UP (ref 0–0)
PLATELET # BLD AUTO: 199 K/UL — SIGNIFICANT CHANGE UP (ref 130–400)
POTASSIUM SERPL-MCNC: 4.1 MMOL/L — SIGNIFICANT CHANGE UP (ref 3.5–5)
POTASSIUM SERPL-SCNC: 4.1 MMOL/L — SIGNIFICANT CHANGE UP (ref 3.5–5)
PROCALCITONIN SERPL-MCNC: 0.12 NG/ML — HIGH (ref 0.02–0.1)
PROT SERPL-MCNC: 5.9 G/DL — LOW (ref 6–8)
RBC # BLD: 4.5 M/UL — SIGNIFICANT CHANGE UP (ref 4.2–5.4)
RBC # FLD: 14 % — SIGNIFICANT CHANGE UP (ref 11.5–14.5)
SODIUM SERPL-SCNC: 142 MMOL/L — SIGNIFICANT CHANGE UP (ref 135–146)
WBC # BLD: 5.18 K/UL — SIGNIFICANT CHANGE UP (ref 4.8–10.8)
WBC # FLD AUTO: 5.18 K/UL — SIGNIFICANT CHANGE UP (ref 4.8–10.8)

## 2022-01-31 PROCEDURE — 99233 SBSQ HOSP IP/OBS HIGH 50: CPT

## 2022-01-31 RX ADMIN — Medication 6 MILLIGRAM(S): at 05:42

## 2022-01-31 RX ADMIN — ENOXAPARIN SODIUM 40 MILLIGRAM(S): 100 INJECTION SUBCUTANEOUS at 13:11

## 2022-01-31 RX ADMIN — ATENOLOL 25 MILLIGRAM(S): 25 TABLET ORAL at 05:42

## 2022-01-31 RX ADMIN — CHLORHEXIDINE GLUCONATE 1 APPLICATION(S): 213 SOLUTION TOPICAL at 05:49

## 2022-01-31 RX ADMIN — AMLODIPINE BESYLATE 5 MILLIGRAM(S): 2.5 TABLET ORAL at 05:42

## 2022-01-31 RX ADMIN — Medication 50 MICROGRAM(S): at 05:42

## 2022-01-31 RX ADMIN — REMDESIVIR 500 MILLIGRAM(S): 5 INJECTION INTRAVENOUS at 16:42

## 2022-02-01 LAB
A1C WITH ESTIMATED AVERAGE GLUCOSE RESULT: 7.2 % — HIGH (ref 4–5.6)
ALBUMIN SERPL ELPH-MCNC: 3.4 G/DL — LOW (ref 3.5–5.2)
ALP SERPL-CCNC: 78 U/L — SIGNIFICANT CHANGE UP (ref 30–115)
ALT FLD-CCNC: 16 U/L — SIGNIFICANT CHANGE UP (ref 0–41)
ANION GAP SERPL CALC-SCNC: 10 MMOL/L — SIGNIFICANT CHANGE UP (ref 7–14)
AST SERPL-CCNC: 22 U/L — SIGNIFICANT CHANGE UP (ref 0–41)
BILIRUB SERPL-MCNC: 0.4 MG/DL — SIGNIFICANT CHANGE UP (ref 0.2–1.2)
BUN SERPL-MCNC: 22 MG/DL — HIGH (ref 10–20)
CALCIUM SERPL-MCNC: 8.5 MG/DL — SIGNIFICANT CHANGE UP (ref 8.5–10.1)
CHLORIDE SERPL-SCNC: 107 MMOL/L — SIGNIFICANT CHANGE UP (ref 98–110)
CO2 SERPL-SCNC: 26 MMOL/L — SIGNIFICANT CHANGE UP (ref 17–32)
CREAT SERPL-MCNC: 0.7 MG/DL — SIGNIFICANT CHANGE UP (ref 0.7–1.5)
ESTIMATED AVERAGE GLUCOSE: 160 MG/DL — HIGH (ref 68–114)
GLUCOSE BLDC GLUCOMTR-MCNC: 142 MG/DL — HIGH (ref 70–99)
GLUCOSE BLDC GLUCOMTR-MCNC: 167 MG/DL — HIGH (ref 70–99)
GLUCOSE BLDC GLUCOMTR-MCNC: 234 MG/DL — HIGH (ref 70–99)
GLUCOSE SERPL-MCNC: 223 MG/DL — HIGH (ref 70–99)
HCT VFR BLD CALC: 40 % — SIGNIFICANT CHANGE UP (ref 37–47)
HGB BLD-MCNC: 13.3 G/DL — SIGNIFICANT CHANGE UP (ref 12–16)
MCHC RBC-ENTMCNC: 28.8 PG — SIGNIFICANT CHANGE UP (ref 27–31)
MCHC RBC-ENTMCNC: 33.3 G/DL — SIGNIFICANT CHANGE UP (ref 32–37)
MCV RBC AUTO: 86.6 FL — SIGNIFICANT CHANGE UP (ref 81–99)
NRBC # BLD: 0 /100 WBCS — SIGNIFICANT CHANGE UP (ref 0–0)
PLATELET # BLD AUTO: 278 K/UL — SIGNIFICANT CHANGE UP (ref 130–400)
POTASSIUM SERPL-MCNC: 4 MMOL/L — SIGNIFICANT CHANGE UP (ref 3.5–5)
POTASSIUM SERPL-SCNC: 4 MMOL/L — SIGNIFICANT CHANGE UP (ref 3.5–5)
PROT SERPL-MCNC: 6.3 G/DL — SIGNIFICANT CHANGE UP (ref 6–8)
RBC # BLD: 4.62 M/UL — SIGNIFICANT CHANGE UP (ref 4.2–5.4)
RBC # FLD: 14.2 % — SIGNIFICANT CHANGE UP (ref 11.5–14.5)
SODIUM SERPL-SCNC: 143 MMOL/L — SIGNIFICANT CHANGE UP (ref 135–146)
WBC # BLD: 7.71 K/UL — SIGNIFICANT CHANGE UP (ref 4.8–10.8)
WBC # FLD AUTO: 7.71 K/UL — SIGNIFICANT CHANGE UP (ref 4.8–10.8)

## 2022-02-01 PROCEDURE — 93970 EXTREMITY STUDY: CPT | Mod: 26

## 2022-02-01 PROCEDURE — 99233 SBSQ HOSP IP/OBS HIGH 50: CPT

## 2022-02-01 RX ORDER — SODIUM CHLORIDE 9 MG/ML
1000 INJECTION, SOLUTION INTRAVENOUS
Refills: 0 | Status: DISCONTINUED | OUTPATIENT
Start: 2022-02-01 | End: 2022-02-02

## 2022-02-01 RX ORDER — DEXTROSE 50 % IN WATER 50 %
25 SYRINGE (ML) INTRAVENOUS ONCE
Refills: 0 | Status: DISCONTINUED | OUTPATIENT
Start: 2022-02-01 | End: 2022-02-02

## 2022-02-01 RX ORDER — DEXTROSE 50 % IN WATER 50 %
12.5 SYRINGE (ML) INTRAVENOUS ONCE
Refills: 0 | Status: DISCONTINUED | OUTPATIENT
Start: 2022-02-01 | End: 2022-02-02

## 2022-02-01 RX ORDER — INSULIN LISPRO 100/ML
VIAL (ML) SUBCUTANEOUS
Refills: 0 | Status: DISCONTINUED | OUTPATIENT
Start: 2022-02-01 | End: 2022-02-02

## 2022-02-01 RX ORDER — DEXTROSE 50 % IN WATER 50 %
15 SYRINGE (ML) INTRAVENOUS ONCE
Refills: 0 | Status: DISCONTINUED | OUTPATIENT
Start: 2022-02-01 | End: 2022-02-02

## 2022-02-01 RX ORDER — GLUCAGON INJECTION, SOLUTION 0.5 MG/.1ML
1 INJECTION, SOLUTION SUBCUTANEOUS ONCE
Refills: 0 | Status: DISCONTINUED | OUTPATIENT
Start: 2022-02-01 | End: 2022-02-02

## 2022-02-01 RX ADMIN — Medication 4: at 12:10

## 2022-02-01 RX ADMIN — Medication 50 MICROGRAM(S): at 06:45

## 2022-02-01 RX ADMIN — AMLODIPINE BESYLATE 5 MILLIGRAM(S): 2.5 TABLET ORAL at 06:45

## 2022-02-01 RX ADMIN — REMDESIVIR 500 MILLIGRAM(S): 5 INJECTION INTRAVENOUS at 17:19

## 2022-02-01 RX ADMIN — Medication 2: at 17:19

## 2022-02-01 RX ADMIN — ENOXAPARIN SODIUM 40 MILLIGRAM(S): 100 INJECTION SUBCUTANEOUS at 12:07

## 2022-02-01 RX ADMIN — Medication 6 MILLIGRAM(S): at 06:46

## 2022-02-01 RX ADMIN — ATENOLOL 25 MILLIGRAM(S): 25 TABLET ORAL at 06:45

## 2022-02-01 RX ADMIN — CHLORHEXIDINE GLUCONATE 1 APPLICATION(S): 213 SOLUTION TOPICAL at 06:46

## 2022-02-01 NOTE — PROGRESS NOTE ADULT - SUBJECTIVE AND OBJECTIVE BOX
HIEN SANCHEZ 89y Female  MRN#: [id number]   CODE STATUS: Full code  Hospital Day: 2d    Pt is currently admitted with the primary diagnosis of COVID PNA    SUBJECTIVE    Subjective complaints   Doing well on oxygen  Present Today:   - Dean:  No [ x ], Yes [   ] : Indication:     - Type of IV Access:       .. CVC/Piccline:  No [ x ], Yes [   ] : Indication:       .. Midline: No [ x ], Yes [   ] : Indication:                                             ----------------------------------------------------------  OBJECTIVE  PAST MEDICAL & SURGICAL HISTORY  High blood cholesterol    Hypertension    Peptic ulcer    DM (diabetes mellitus)    Obesity    History of cholecystectomy    H/O abdominal hysterectomy    Perforated chronic gastric ulcer                                              -----------------------------------------------------------  ALLERGIES:  No Known Allergies                                            ------------------------------------------------------------    HOME MEDICATIONS  Home Medications:  atenolol 25 mg oral tablet: 1 tab(s) orally once a day (14 Oct 2020 17:41)  levothyroxine 50 mcg (0.05 mg) oral tablet: 1 tab(s) orally once a day (30 Jan 2022 11:36)                           MEDICATIONS:  STANDING MEDICATIONS  albuterol/ipratropium for Nebulization 3 milliLiter(s) Nebulizer every 6 hours  amLODIPine   Tablet 5 milliGRAM(s) Oral daily  ATENolol  Tablet 25 milliGRAM(s) Oral daily  chlorhexidine 4% Liquid 1 Application(s) Topical <User Schedule>  dexAMETHasone  Injectable 6 milliGRAM(s) IV Push daily  dextrose 40% Gel 15 Gram(s) Oral once  dextrose 5%. 1000 milliLiter(s) IV Continuous <Continuous>  dextrose 5%. 1000 milliLiter(s) IV Continuous <Continuous>  dextrose 50% Injectable 25 Gram(s) IV Push once  dextrose 50% Injectable 12.5 Gram(s) IV Push once  dextrose 50% Injectable 25 Gram(s) IV Push once  enoxaparin Injectable 40 milliGRAM(s) SubCutaneous daily  glucagon  Injectable 1 milliGRAM(s) IntraMuscular once  insulin lispro (ADMELOG) corrective regimen sliding scale   SubCutaneous three times a day before meals  levothyroxine 50 MICROGram(s) Oral daily  remdesivir  IVPB 100 milliGRAM(s) IV Intermittent every 24 hours  remdesivir  IVPB   IV Intermittent     PRN MEDICATIONS                                            ------------------------------------------------------------  VITAL SIGNS: Last 24 Hours  T(C): 35.9 (01 Feb 2022 07:37), Max: 36.1 (31 Jan 2022 15:47)  T(F): 96.6 (01 Feb 2022 07:37), Max: 97 (31 Jan 2022 15:47)  HR: 59 (01 Feb 2022 07:37) (57 - 59)  BP: 179/75 (01 Feb 2022 07:37) (116/82 - 179/75)  BP(mean): --  RR: 18 (01 Feb 2022 07:37) (18 - 18)  SpO2: 93% (01 Feb 2022 07:37) (93% - 95%)      01-31-22 @ 07:01  -  02-01-22 @ 07:00  --------------------------------------------------------  IN: 0 mL / OUT: 700 mL / NET: -700 mL                                             --------------------------------------------------------------  LABS:                        13.3   7.71  )-----------( 278      ( 01 Feb 2022 06:10 )             40.0     02-01    143  |  107  |  22<H>  ----------------------------<  223<H>  4.0   |  26  |  0.7    Ca    8.5      01 Feb 2022 06:10  Mg     2.0     01-31    TPro  6.3  /  Alb  3.4<L>  /  TBili  0.4  /  DBili  x   /  AST  22  /  ALT  16  /  AlkPhos  78  02-01                                                              -------------------------------------------------------------  RADIOLOGY:                                            --------------------------------------------------------------    PHYSICAL EXAM:  General: alert oriented x3  HEENT: non remarkable  LUNGS: decreased air sounds bilaterally  HEART: non remarkable  ABDOMEN: soft non tender  EXT: no edema                                             --------------------------------------------------------------    ASSESSMENT & PLAN    89yo F w/ PMHx of ?COPD home 3L, Hypothyroidism with thyroid nodules, HTN, Hysterectomy, Laparoscopic Cholecystectomy, and Perforated gastric ulcer presenting for COVID.      # Acute on chronic hypoxemic respiratory failure  # COVID  - Not septic on admission  - 98% on 3 L, at baseline  - First symptomatic on 1/25  - D-dimer 521 (negative), other inflammatory markers pending  - Remdesivir and Dexamethasone    # COPD in exacerbation  - On 3 L at home, now at baseline  - Dexamethasone for COVID  - Duoneb treatments  - Does not have pulmonologist per daughter    # Hypothyroidism  - Contnie synthoid    # Hypokalemia  - Replete, monitor  - Resoved    # H/o HTN  - Continue atenolol and amlodipine    DVT PPX: Lovenox  Diet: DASH                            
Progress Note:  Provider Speciality                            Hospitalist      HIEN SANCHEZ [id number] 89y Female     CHIEF PRESENTING COMPLAINT:  Patient is a 89y old  Female who presents with a chief complaint of COVID (30 Jan 2022 15:06)        SUBJECTIVE:  Patient was seen and examined at bedside.  No new complaints described by patient in morning rounds.   . No significant overnight events reported.     HISTORY OF PRESENTING ILLNESS:  HPI:  89yo F w/ PMHx of ?COPD home 3L, Hypothyroidism with thyroid nodules, HTN, Hysterectomy, Laparoscopic Cholecystectomy, and Perforated gastric ulcer presenting w/ SOB , associated w/ diarrhea, fatigue and mild cough productive of white sputum. Symptoms started on 1/25. Daughter reports she did a at home covid test which was positive. She noted today pt desaturated to 80's on  her baseline 3L. denies any fevers. no unilateral leg swelling. SHe is not vaccinated.    ED course: Was 94% on 6L then improved to 98% on 3L (home dose) after dexamethasone. Will be admitted for closer monitoring. (30 Jan 2022 10:47)        REVIEW OF SYSTEMS:  Patient denies any headache, any vision complaints, runny nose, fever, chills, sore throat. Denies chest pain,palpitation. Denies nausea, vomiting, abdominal pain, diarrhoea, Denies urinary burning, urgency, frequency, dysuria.At least 10 systems were reviewed in ROS. All systems reviewed  are within normal limits except for the complaints as described in Subjective.    PAST MEDICAL & SURGICAL HISTORY:  PAST MEDICAL & SURGICAL HISTORY:  High blood cholesterol    Hypertension    Peptic ulcer    DM (diabetes mellitus)    Obesity    History of cholecystectomy    H/O abdominal hysterectomy    Perforated chronic gastric ulcer            VITAL SIGNS:  Vital Signs Last 24 Hrs  T(C): 35.9 (01 Feb 2022 07:37), Max: 36.1 (31 Jan 2022 15:47)  T(F): 96.6 (01 Feb 2022 07:37), Max: 97 (31 Jan 2022 15:47)  HR: 59 (01 Feb 2022 07:37) (57 - 59)  BP: 179/75 (01 Feb 2022 07:37) (116/82 - 179/75)  BP(mean): --  RR: 18 (01 Feb 2022 07:37) (18 - 18)  SpO2: 93% (01 Feb 2022 07:37) (93% - 95%)    PHYSICAL EXAMINATION:  Not in acute distress, obese  General: No pallor, no icterus  HEENT:   EOMI, no JVD.  Heart: S1+S2 audible  Lungs: bilateral  moderate air entry, no wheezing, no crepitations.  Abdomen: Soft, non-tender, non-distended , no  rigidity or guarding.  CNS: Awake alert, CN  grossly intact.  Extremities:  No edema            CONSULTS:  Consultant(s) Notes Reviewed by me.   Care Discussed with Consultants/Other Providers where required.        MEDICATIONS:  MEDICATIONS  (STANDING):  albuterol/ipratropium for Nebulization 3 milliLiter(s) Nebulizer every 6 hours  amLODIPine   Tablet 5 milliGRAM(s) Oral daily  ATENolol  Tablet 25 milliGRAM(s) Oral daily  chlorhexidine 4% Liquid 1 Application(s) Topical <User Schedule>  dexAMETHasone  Injectable 6 milliGRAM(s) IV Push daily  enoxaparin Injectable 40 milliGRAM(s) SubCutaneous daily  levothyroxine 50 MICROGram(s) Oral daily  remdesivir  IVPB 100 milliGRAM(s) IV Intermittent every 24 hours  remdesivir  IVPB   IV Intermittent     MEDICATIONS  (PRN):            ASSESSMENT:        89 yo F w/ PMHx of ?COPD home 3L, Hypothyroidism with thyroid nodules, HTN, Hysterectomy, and Perforated gastric ulcer presenting w/ SOB , associated w/ diarrhea, fatigue and mild cough productive of white sputum.    Covid PNA with severe illness  Possible COPD exacerbation  Pt is unvaccinated  Hypothyroidism  Continue RDV for 5 day course  Dex 6 mg for 10 days   Follow inflammatory markers. Low PCT. No need for abx  High blood cholesterol   Hypertension -well controlled   Handoff: Discharge anticipated for tomorrow pending PT  
Progress Note:  Provider Speciality                            Hospitalist      HIEN SANCHEZ MRN-[id number] 89y Female     CHIEF PRESENTING COMPLAINT:  Patient is a 89y old  Female who presents with a chief complaint of COVID (30 Jan 2022 15:06)        SUBJECTIVE:  Patient was seen and examined at bedside.  No new complaints described by patient in morning rounds.   . No significant overnight events reported.     HISTORY OF PRESENTING ILLNESS:  HPI:  89yo F w/ PMHx of ?COPD home 3L, Hypothyroidism with thyroid nodules, HTN, Hysterectomy, Laparoscopic Cholecystectomy, and Perforated gastric ulcer presenting w/ SOB , associated w/ diarrhea, fatigue and mild cough productive of white sputum. Symptoms started on 1/25. Daughter reports she did a at home covid test which was positive. She noted today pt desaturated to 80's on  her baseline 3L. denies any fevers. no unilateral leg swelling. SHe is not vaccinated.    ED course: Was 94% on 6L then improved to 98% on 3L (home dose) after dexamethasone. Will be admitted for closer monitoring. (30 Jan 2022 10:47)        REVIEW OF SYSTEMS:  Patient denies any headache, any vision complaints, runny nose, fever, chills, sore throat. Denies chest pain,palpitation. Denies nausea, vomiting, abdominal pain, diarrhoea, Denies urinary burning, urgency, frequency, dysuria.At least 10 systems were reviewed in ROS. All systems reviewed  are within normal limits except for the complaints as described in Subjective.    PAST MEDICAL & SURGICAL HISTORY:  PAST MEDICAL & SURGICAL HISTORY:  High blood cholesterol    Hypertension    Peptic ulcer    DM (diabetes mellitus)    Obesity    History of cholecystectomy    H/O abdominal hysterectomy    Perforated chronic gastric ulcer            VITAL SIGNS:  Vital Signs Last 24 Hrs  T(C): 36.4 (31 Jan 2022 08:05), Max: 36.4 (31 Jan 2022 08:05)  T(F): 97.5 (31 Jan 2022 08:05), Max: 97.5 (31 Jan 2022 08:05)  HR: 57 (31 Jan 2022 08:05) (57 - 63)  BP: 137/68 (31 Jan 2022 08:05) (134/61 - 137/68)  BP(mean): --  RR: 18 (31 Jan 2022 08:05) (18 - 18)  SpO2: 100% (31 Jan 2022 08:05) (100% - 100%)          PHYSICAL EXAMINATION:  Not in acute distress, obese  General: No pallor, no icterus  HEENT:   EOMI, no JVD.  Heart: S1+S2 audible  Lungs: bilateral  moderate air entry, no wheezing, no crepitations.  Abdomen: Soft, non-tender, non-distended , no  rigidity or guarding.  CNS: Awake alert, CN  grossly intact.  Extremities:  No edema            CONSULTS:  Consultant(s) Notes Reviewed by me.   Care Discussed with Consultants/Other Providers where required.        MEDICATIONS:  MEDICATIONS  (STANDING):  albuterol/ipratropium for Nebulization 3 milliLiter(s) Nebulizer every 6 hours  amLODIPine   Tablet 5 milliGRAM(s) Oral daily  ATENolol  Tablet 25 milliGRAM(s) Oral daily  chlorhexidine 4% Liquid 1 Application(s) Topical <User Schedule>  dexAMETHasone  Injectable 6 milliGRAM(s) IV Push daily  enoxaparin Injectable 40 milliGRAM(s) SubCutaneous daily  levothyroxine 50 MICROGram(s) Oral daily  remdesivir  IVPB 100 milliGRAM(s) IV Intermittent every 24 hours  remdesivir  IVPB   IV Intermittent     MEDICATIONS  (PRN):            ASSESSMENT:        89 yo F w/ PMHx of ?COPD home 3L, Hypothyroidism with thyroid nodules, HTN, Hysterectomy, and Perforated gastric ulcer presenting w/ SOB , associated w/ diarrhea, fatigue and mild cough productive of white sputum.    Covid PNA with severe illness  Possible COPD exacerbation  Pt is unvaccinated  Hypothyroidism  Continue RDV for 5 day course  Dex 6 mg for 10 days   Follow inflammatory markers. Low PCT. No need for abx  High blood cholesterol   Hypertension -well controlled   Handoff: Discharge anticipated for tomorrow pending clinical improvment

## 2022-02-01 NOTE — PROGRESS NOTE ADULT - ATTENDING COMMENTS
89 yo F w/ PMHx of ?COPD home 3L, Hypothyroidism with thyroid nodules, HTN, Hysterectomy, and Perforated gastric ulcer presenting w/ SOB , associated w/ diarrhea, fatigue and mild cough productive of white sputum.    Covid PNA-- with scattered opacities-- at basal oxygen req.  CRP 27 ferritin 392  d-dimer 521  COPD on home oxygen-- takes 2L  Pt is unvaccinated  completed 4 days of RDV-- spoke with daughter they do not want 5 days to be completed as patient getting confused and upset here  she will be better off at home  and  she had no respiratory symptoms-- only GI upset  can get po prednisone 20mg for 2 more days    Hypothyroidism on synthroid   Diabetes-- takes no medication. her hba1c is 7.2     Hypertension -well controlled     DC plan today-- spent more than 30mins.

## 2022-02-01 NOTE — CHART NOTE - NSCHARTNOTEFT_GEN_A_CORE
Patient's emergency contact was contacted by the communication team after PT performed their initial evaluation and recommended discharging patient to either SNF or Home with [facility]. Recommendations were informs to patient's  and was informed that a care/ would follow up with them in regards to what options ar available to them based on insurance approval.

## 2022-02-02 ENCOUNTER — TRANSCRIPTION ENCOUNTER (OUTPATIENT)
Age: 87
End: 2022-02-02

## 2022-02-02 VITALS
DIASTOLIC BLOOD PRESSURE: 79 MMHG | SYSTOLIC BLOOD PRESSURE: 143 MMHG | HEART RATE: 57 BPM | TEMPERATURE: 97 F | OXYGEN SATURATION: 94 % | RESPIRATION RATE: 18 BRPM

## 2022-02-02 LAB
ANION GAP SERPL CALC-SCNC: 10 MMOL/L — SIGNIFICANT CHANGE UP (ref 7–14)
BUN SERPL-MCNC: 21 MG/DL — HIGH (ref 10–20)
CALCIUM SERPL-MCNC: 8.2 MG/DL — LOW (ref 8.5–10.1)
CHLORIDE SERPL-SCNC: 107 MMOL/L — SIGNIFICANT CHANGE UP (ref 98–110)
CO2 SERPL-SCNC: 26 MMOL/L — SIGNIFICANT CHANGE UP (ref 17–32)
CREAT SERPL-MCNC: 0.6 MG/DL — LOW (ref 0.7–1.5)
GLUCOSE BLDC GLUCOMTR-MCNC: 165 MG/DL — HIGH (ref 70–99)
GLUCOSE BLDC GLUCOMTR-MCNC: 176 MG/DL — HIGH (ref 70–99)
GLUCOSE BLDC GLUCOMTR-MCNC: 182 MG/DL — HIGH (ref 70–99)
GLUCOSE BLDC GLUCOMTR-MCNC: 213 MG/DL — HIGH (ref 70–99)
GLUCOSE BLDC GLUCOMTR-MCNC: 256 MG/DL — HIGH (ref 70–99)
GLUCOSE SERPL-MCNC: 243 MG/DL — HIGH (ref 70–99)
HCT VFR BLD CALC: 39.2 % — SIGNIFICANT CHANGE UP (ref 37–47)
HGB BLD-MCNC: 13.1 G/DL — SIGNIFICANT CHANGE UP (ref 12–16)
MAGNESIUM SERPL-MCNC: 1.8 MG/DL — SIGNIFICANT CHANGE UP (ref 1.8–2.4)
MCHC RBC-ENTMCNC: 28.7 PG — SIGNIFICANT CHANGE UP (ref 27–31)
MCHC RBC-ENTMCNC: 33.4 G/DL — SIGNIFICANT CHANGE UP (ref 32–37)
MCV RBC AUTO: 85.8 FL — SIGNIFICANT CHANGE UP (ref 81–99)
NRBC # BLD: 0 /100 WBCS — SIGNIFICANT CHANGE UP (ref 0–0)
PLATELET # BLD AUTO: 266 K/UL — SIGNIFICANT CHANGE UP (ref 130–400)
POTASSIUM SERPL-MCNC: 3.6 MMOL/L — SIGNIFICANT CHANGE UP (ref 3.5–5)
POTASSIUM SERPL-SCNC: 3.6 MMOL/L — SIGNIFICANT CHANGE UP (ref 3.5–5)
RBC # BLD: 4.57 M/UL — SIGNIFICANT CHANGE UP (ref 4.2–5.4)
RBC # FLD: 14.2 % — SIGNIFICANT CHANGE UP (ref 11.5–14.5)
SODIUM SERPL-SCNC: 143 MMOL/L — SIGNIFICANT CHANGE UP (ref 135–146)
WBC # BLD: 6.68 K/UL — SIGNIFICANT CHANGE UP (ref 4.8–10.8)
WBC # FLD AUTO: 6.68 K/UL — SIGNIFICANT CHANGE UP (ref 4.8–10.8)

## 2022-02-02 PROCEDURE — 99239 HOSP IP/OBS DSCHRG MGMT >30: CPT

## 2022-02-02 RX ORDER — ASPIRIN/CALCIUM CARB/MAGNESIUM 324 MG
1 TABLET ORAL
Qty: 30 | Refills: 0
Start: 2022-02-02 | End: 2022-03-03

## 2022-02-02 RX ADMIN — Medication 4: at 17:03

## 2022-02-02 RX ADMIN — Medication 6 MILLIGRAM(S): at 05:15

## 2022-02-02 RX ADMIN — Medication 6: at 08:25

## 2022-02-02 RX ADMIN — AMLODIPINE BESYLATE 5 MILLIGRAM(S): 2.5 TABLET ORAL at 05:15

## 2022-02-02 RX ADMIN — Medication 2: at 11:30

## 2022-02-02 RX ADMIN — Medication 50 MICROGRAM(S): at 05:15

## 2022-02-02 RX ADMIN — CHLORHEXIDINE GLUCONATE 1 APPLICATION(S): 213 SOLUTION TOPICAL at 05:20

## 2022-02-02 RX ADMIN — ENOXAPARIN SODIUM 40 MILLIGRAM(S): 100 INJECTION SUBCUTANEOUS at 11:25

## 2022-02-02 RX ADMIN — ATENOLOL 25 MILLIGRAM(S): 25 TABLET ORAL at 05:20

## 2022-02-02 NOTE — DISCHARGE NOTE PROVIDER - NSDCCPCAREPLAN_GEN_ALL_CORE_FT
PRINCIPAL DISCHARGE DIAGNOSIS  Diagnosis: COVID  Assessment and Plan of Treatment: You have been diagnosed with COVID-19 infection. You took Remdesivir and Dexamethasone for 3 days. Your oxygen requirements have not changed as compared to your baseline. Please follow up with your primary care physician in 2 weeks.  If you have any fever, chills, or shortness of breath, please come back to the hospital.

## 2022-02-02 NOTE — DISCHARGE NOTE PROVIDER - CARE PROVIDER_API CALL
Elda Figueroa  INTERNAL MEDICINE  [address] [place] [address]  Phone: [phone]  Fax: [phone]  Follow Up Time: 2 weeks

## 2022-02-02 NOTE — DISCHARGE NOTE NURSING/CASE MANAGEMENT/SOCIAL WORK - NSDCPEFALRISK_GEN_ALL_CORE
For information on Fall & Injury Prevention, visit: [email or web address] OR  [email or web address] OR  https://www.cdc.gov/steadi/patient.html

## 2022-02-02 NOTE — DISCHARGE NOTE PROVIDER - HOSPITAL COURSE
89yo F w/ PMHx of ?COPD home 3L, Hypothyroidism with thyroid nodules, HTN, Hysterectomy, Laparoscopic Cholecystectomy, and Perforated gastric ulcer presenting for COVID.      # Acute on chronic hypoxemic respiratory failure  # COVID  - Not septic on admission  - 98% on 3 L, at baseline  - First symptomatic on 1/25  - D-dimer 521, LE duplex done  - Remdesivir and Dexamethasone Took 3 days    #DMII:  - Diagnosed in hospital  - A1c: 7.2%  - Will not start her on anti-diabetic agents due to age    # COPD in exacerbation  - On 3 L at home, now at baseline  - Dexamethasone for COVID  - Duoneb treatments  - Does not have pulmonologist per daughter    # Hypothyroidism  - Continue synthoid    # Hypokalemia  - Replete, monitor  - Resolved    # H/o HTN  - Continue atenolol and amlodipine    DVT PPX: Lovenox  Diet: DASH

## 2022-02-02 NOTE — DISCHARGE NOTE NURSING/CASE MANAGEMENT/SOCIAL WORK - PATIENT PORTAL LINK FT
You can access the FollowMyHealth Patient Portal offered by [facility] by registering at the following website: [email or web address]. By joining [employer]’s FollowMyHealth portal, you will also be able to view your health information using other applications (apps) compatible with our system.

## 2022-02-02 NOTE — DISCHARGE NOTE PROVIDER - NSDCMRMEDTOKEN_GEN_ALL_CORE_FT
amLODIPine 5 mg oral tablet: 1 tab(s) orally once a day  atenolol 25 mg oral tablet: 1 tab(s) orally once a day  levothyroxine 50 mcg (0.05 mg) oral tablet: 1 tab(s) orally once a day

## 2022-02-02 NOTE — DISCHARGE NOTE NURSING/CASE MANAGEMENT/SOCIAL WORK - NSDCVIVACCINE_GEN_ALL_CORE_FT
Tdap; 14-Oct-2020 02:43; Neda Villasenor (RN); [employer]; [id number] (Exp. Date: 09-Jul-2022); IntraMuscular; Deltoid Right.; 0.5 milliLiter(s); VIS (VIS Published: 09-May-2013, VIS Presented: 14-Oct-2020);

## 2022-02-07 DIAGNOSIS — Z87.891 PERSONAL HISTORY OF NICOTINE DEPENDENCE: ICD-10-CM

## 2022-02-07 DIAGNOSIS — E11.9 TYPE 2 DIABETES MELLITUS WITHOUT COMPLICATIONS: ICD-10-CM

## 2022-02-07 DIAGNOSIS — J12.82 PNEUMONIA DUE TO CORONAVIRUS DISEASE 2019: ICD-10-CM

## 2022-02-07 DIAGNOSIS — Z90.710 ACQUIRED ABSENCE OF BOTH CERVIX AND UTERUS: ICD-10-CM

## 2022-02-07 DIAGNOSIS — H91.90 UNSPECIFIED HEARING LOSS, UNSPECIFIED EAR: ICD-10-CM

## 2022-02-07 DIAGNOSIS — I10 ESSENTIAL (PRIMARY) HYPERTENSION: ICD-10-CM

## 2022-02-07 DIAGNOSIS — Z99.81 DEPENDENCE ON SUPPLEMENTAL OXYGEN: ICD-10-CM

## 2022-02-07 DIAGNOSIS — Z90.49 ACQUIRED ABSENCE OF OTHER SPECIFIED PARTS OF DIGESTIVE TRACT: ICD-10-CM

## 2022-02-07 DIAGNOSIS — E03.9 HYPOTHYROIDISM, UNSPECIFIED: ICD-10-CM

## 2022-02-07 DIAGNOSIS — E87.6 HYPOKALEMIA: ICD-10-CM

## 2022-02-07 DIAGNOSIS — J96.21 ACUTE AND CHRONIC RESPIRATORY FAILURE WITH HYPOXIA: ICD-10-CM

## 2022-02-07 DIAGNOSIS — U07.1 COVID-19: ICD-10-CM

## 2022-02-07 DIAGNOSIS — J44.1 CHRONIC OBSTRUCTIVE PULMONARY DISEASE WITH (ACUTE) EXACERBATION: ICD-10-CM

## 2022-02-07 DIAGNOSIS — J44.0 CHRONIC OBSTRUCTIVE PULMONARY DISEASE WITH (ACUTE) LOWER RESPIRATORY INFECTION: ICD-10-CM

## 2022-02-07 DIAGNOSIS — E78.00 PURE HYPERCHOLESTEROLEMIA, UNSPECIFIED: ICD-10-CM

## 2022-04-01 NOTE — ED ADULT NURSE NOTE - NS ED NURSE TRANSPORT WITH
"SPIRITUAL HEALTH SERVICES  [facility]  Progress Note    REFERRAL SOURCE: Oncology Distress Screen    NOTE: I spoke with Tiffanie, by phone, regarding her answer on her latest Oncology Distress Screen with regard to struggling with the loss of meaning in [place] in her life.  Her answer indicated \"Quite a bit!\"  Tiffanie was open to my introduction and to emotional and spiritual support.  She didn't remember answering that question, & feels like her answer now would be more positive to it.  I invited her to tell me about the challenges she has been encountering and how she is coping.  She reported, \"I am just dealing with a lot of things right now, and those things are things I can't control.\"  Tiffanie reported that her [employer] is a source of support for her around these issues.  I offered my continued availability also to her.    PLAN: I will continue to be available to patient for any ongoing support needs.    Gilbert Martinez, Ph.d, [facility]  [place] [id number] [facility] [place] [address]    Office: [phone]   [email or web address]    "
Cardiac Monitor/Defib/ACLS/Rescue Kit/O2/BVM/oxygen

## 2022-09-16 PROBLEM — Z00.00 ENCOUNTER FOR PREVENTIVE HEALTH EXAMINATION: Status: ACTIVE | Noted: 2022-09-16

## 2023-01-09 NOTE — PATIENT PROFILE ADULT - FUNCTIONAL ASSESSMENT - DAILY ACTIVITY SECTION LABEL
ANTICOAGULATION MANAGEMENT     Ronna Coleman 25 year old female is on warfarin with therapeutic INR result. (Goal INR 2.0-3.0)    Recent labs: (last 7 days)     01/09/23  0958   INR 2.18*       ASSESSMENT       Source(s): Chart Review    Previous INR was Subtherapeutic    Medication, diet, health changes since last INR chart reviewed; none identified           PLAN     Recommended plan for no diet, medication or health factor changes affecting INR     Dosing Instructions: Continue your current warfarin dose with next INR in 4 weeks       Summary  As of 1/9/2023    Full warfarin instructions:  10 mg every Thu; 7.5 mg all other days   Next INR check:  2/6/2023             Detailed voice message left for Ronna with dosing instructions and follow up date.     Contact [phone]  to schedule and with any changes, questions or concerns.     Education provided:     Please call back if any changes to your diet, medications or how you've been taking warfarin    Plan made per [facility] anticoagulation protocol    Jl Briseno, RN  Anticoagulation Clinic  1/9/2023    _______________________________________________________________________     Anticoagulation Episode Summary     Current INR goal:  2.0-3.0   TTR:  76.3 % (3.6 mo)   Target end date:  9/30/2023   Send INR reminders to:  [facility] [place]    Indications    Antiphospholipid antibody positive [R76.0]  Long term current use of anticoagulants with INR goal of 2.0-3.0 [Z79.01]  Acute deep vein thrombosis (DVT) of proximal end of both lower extremities (H) [I82.4Y3]           Comments:           Anticoagulation Care Providers     Provider Role Specialty Phone number    Jose Tierney MD Referring Family Medicine [phone]            
.

## 2023-07-12 NOTE — ED ADULT NURSE NOTE - NS ED NURSE PATIENT LEFT UNIT TIME
Operative Note      Patient: Landon Hugo  YOB: 1972  MRN: [id number]    Date of Procedure: 7/12/2023    Pre-Op Diagnosis Codes: * Neoplasm of uncertain behavior of skin [D48.5]    Post-Op Diagnosis: Same       Procedure(s):  LEFT CHEEK LESION EXCISION    Surgeon(s):  Anupam Sosa MD    Assistant:   * No surgical staff found *    Anesthesia: General    Estimated Blood Loss (mL): Minimal    Complications: None    Specimens:   ID Type Source Tests Collected by Time Destination   A : left cheek lesion  Tissue Skin SURGICAL PATHOLOGY Anupam Sosa MD 7/12/2023 1037        Implants:  * No implants in log *      Drains: * No LDAs found *    Findings: 6mm lesion left cheek    This procedure was not performed to treat primary cutaneous melanoma through wide local excision      Detailed Description of Procedure: With the patient by general anesthesia and is via posterior pharyngeal LMA intubation the patient's left cheek was prepped and draped in a sterile fashion. Appropriate timeout was performed. The cheek was injected with half percent Marcaine 1-200,000 epinephrine local.  After appropriate timeout elliptical excision of this lesion measuring 6 mm was carried out. Bovie electrocautery was stasis and multilayer closure with 3-0 Monocryl. This was closed at the deep dermal layer and the intracuticular layer. Steri-Strips were applied. Tolerated this well and follow in the office in 2 weeks .     Electronically signed by Anupam Sosa MD on 7/12/2023 at 11:06 AM 09:27

## 2023-09-01 ENCOUNTER — EMERGENCY (EMERGENCY)
Facility: HOSPITAL | Age: 88
LOS: 0 days | Discharge: HOME | End: 2023-09-01
Attending: EMERGENCY MEDICINE
Payer: MEDICARE

## 2023-09-01 VITALS
OXYGEN SATURATION: 99 % | RESPIRATION RATE: 18 BRPM | SYSTOLIC BLOOD PRESSURE: 138 MMHG | DIASTOLIC BLOOD PRESSURE: 84 MMHG | HEART RATE: 96 BPM | TEMPERATURE: 98 F

## 2023-09-01 DIAGNOSIS — M79.89 OTHER SPECIFIED SOFT TISSUE DISORDERS: ICD-10-CM

## 2023-09-01 DIAGNOSIS — K25.5 CHRONIC OR UNSPECIFIED GASTRIC ULCER WITH PERFORATION: Chronic | ICD-10-CM

## 2023-09-01 DIAGNOSIS — I10 ESSENTIAL (PRIMARY) HYPERTENSION: ICD-10-CM

## 2023-09-01 DIAGNOSIS — Z87.11 PERSONAL HISTORY OF PEPTIC ULCER DISEASE: ICD-10-CM

## 2023-09-01 DIAGNOSIS — Z90.49 ACQUIRED ABSENCE OF OTHER SPECIFIED PARTS OF DIGESTIVE TRACT: ICD-10-CM

## 2023-09-01 DIAGNOSIS — Z79.82 LONG TERM (CURRENT) USE OF ASPIRIN: ICD-10-CM

## 2023-09-01 DIAGNOSIS — M54.9 DORSALGIA, UNSPECIFIED: ICD-10-CM

## 2023-09-01 DIAGNOSIS — Z90.49 ACQUIRED ABSENCE OF OTHER SPECIFIED PARTS OF DIGESTIVE TRACT: Chronic | ICD-10-CM

## 2023-09-01 DIAGNOSIS — J44.9 CHRONIC OBSTRUCTIVE PULMONARY DISEASE, UNSPECIFIED: ICD-10-CM

## 2023-09-01 DIAGNOSIS — E11.51 TYPE 2 DIABETES MELLITUS WITH DIABETIC PERIPHERAL ANGIOPATHY WITHOUT GANGRENE: ICD-10-CM

## 2023-09-01 DIAGNOSIS — Z90.710 ACQUIRED ABSENCE OF BOTH CERVIX AND UTERUS: ICD-10-CM

## 2023-09-01 DIAGNOSIS — M79.601 PAIN IN RIGHT ARM: ICD-10-CM

## 2023-09-01 DIAGNOSIS — E78.00 PURE HYPERCHOLESTEROLEMIA, UNSPECIFIED: ICD-10-CM

## 2023-09-01 DIAGNOSIS — F03.90 UNSPECIFIED DEMENTIA WITHOUT BEHAVIORAL DISTURBANCE: ICD-10-CM

## 2023-09-01 DIAGNOSIS — Z90.710 ACQUIRED ABSENCE OF BOTH CERVIX AND UTERUS: Chronic | ICD-10-CM

## 2023-09-01 DIAGNOSIS — M79.602 PAIN IN LEFT ARM: ICD-10-CM

## 2023-09-01 DIAGNOSIS — R91.8 OTHER NONSPECIFIC ABNORMAL FINDING OF LUNG FIELD: ICD-10-CM

## 2023-09-01 DIAGNOSIS — I70.209 UNSPECIFIED ATHEROSCLEROSIS OF NATIVE ARTERIES OF EXTREMITIES, UNSPECIFIED EXTREMITY: ICD-10-CM

## 2023-09-01 LAB
ALBUMIN SERPL ELPH-MCNC: 3.5 G/DL — SIGNIFICANT CHANGE UP (ref 3.5–5.2)
ALP SERPL-CCNC: 97 U/L — SIGNIFICANT CHANGE UP (ref 30–115)
ALT FLD-CCNC: 6 U/L — SIGNIFICANT CHANGE UP (ref 0–41)
ANION GAP SERPL CALC-SCNC: 10 MMOL/L — SIGNIFICANT CHANGE UP (ref 7–14)
APPEARANCE UR: CLEAR — SIGNIFICANT CHANGE UP
AST SERPL-CCNC: 11 U/L — SIGNIFICANT CHANGE UP (ref 0–41)
BASOPHILS # BLD AUTO: 0.04 K/UL — SIGNIFICANT CHANGE UP (ref 0–0.2)
BASOPHILS NFR BLD AUTO: 0.5 % — SIGNIFICANT CHANGE UP (ref 0–1)
BILIRUB SERPL-MCNC: 0.7 MG/DL — SIGNIFICANT CHANGE UP (ref 0.2–1.2)
BILIRUB UR-MCNC: ABNORMAL
BUN SERPL-MCNC: 8 MG/DL — LOW (ref 10–20)
CALCIUM SERPL-MCNC: 9 MG/DL — SIGNIFICANT CHANGE UP (ref 8.4–10.4)
CHLORIDE SERPL-SCNC: 102 MMOL/L — SIGNIFICANT CHANGE UP (ref 98–110)
CO2 SERPL-SCNC: 27 MMOL/L — SIGNIFICANT CHANGE UP (ref 17–32)
COLOR SPEC: SIGNIFICANT CHANGE UP
CREAT SERPL-MCNC: 0.7 MG/DL — SIGNIFICANT CHANGE UP (ref 0.7–1.5)
DIFF PNL FLD: ABNORMAL
EGFR: 79 ML/MIN/1.73M2 — SIGNIFICANT CHANGE UP
EOSINOPHIL # BLD AUTO: 0 K/UL — SIGNIFICANT CHANGE UP (ref 0–0.7)
EOSINOPHIL NFR BLD AUTO: 0 % — SIGNIFICANT CHANGE UP (ref 0–8)
GLUCOSE SERPL-MCNC: 118 MG/DL — HIGH (ref 70–99)
GLUCOSE UR QL: NEGATIVE MG/DL — SIGNIFICANT CHANGE UP
HCT VFR BLD CALC: 38.8 % — SIGNIFICANT CHANGE UP (ref 37–47)
HGB BLD-MCNC: 12.7 G/DL — SIGNIFICANT CHANGE UP (ref 12–16)
IMM GRANULOCYTES NFR BLD AUTO: 0.4 % — HIGH (ref 0.1–0.3)
KETONES UR-MCNC: 40 MG/DL
LEUKOCYTE ESTERASE UR-ACNC: ABNORMAL
LYMPHOCYTES # BLD AUTO: 1.79 K/UL — SIGNIFICANT CHANGE UP (ref 1.2–3.4)
LYMPHOCYTES # BLD AUTO: 21.7 % — SIGNIFICANT CHANGE UP (ref 20.5–51.1)
MCHC RBC-ENTMCNC: 28.5 PG — SIGNIFICANT CHANGE UP (ref 27–31)
MCHC RBC-ENTMCNC: 32.7 G/DL — SIGNIFICANT CHANGE UP (ref 32–37)
MCV RBC AUTO: 87 FL — SIGNIFICANT CHANGE UP (ref 81–99)
MONOCYTES # BLD AUTO: 0.81 K/UL — HIGH (ref 0.1–0.6)
MONOCYTES NFR BLD AUTO: 9.8 % — HIGH (ref 1.7–9.3)
NEUTROPHILS # BLD AUTO: 5.59 K/UL — SIGNIFICANT CHANGE UP (ref 1.4–6.5)
NEUTROPHILS NFR BLD AUTO: 67.6 % — SIGNIFICANT CHANGE UP (ref 42.2–75.2)
NITRITE UR-MCNC: NEGATIVE — SIGNIFICANT CHANGE UP
NRBC # BLD: 0 /100 WBCS — SIGNIFICANT CHANGE UP (ref 0–0)
PH UR: 6.5 — SIGNIFICANT CHANGE UP (ref 5–8)
PLATELET # BLD AUTO: 274 K/UL — SIGNIFICANT CHANGE UP (ref 130–400)
PMV BLD: 9.5 FL — SIGNIFICANT CHANGE UP (ref 7.4–10.4)
POTASSIUM SERPL-MCNC: 4 MMOL/L — SIGNIFICANT CHANGE UP (ref 3.5–5)
POTASSIUM SERPL-SCNC: 4 MMOL/L — SIGNIFICANT CHANGE UP (ref 3.5–5)
PROT SERPL-MCNC: 7.1 G/DL — SIGNIFICANT CHANGE UP (ref 6–8)
PROT UR-MCNC: 100 MG/DL
RBC # BLD: 4.46 M/UL — SIGNIFICANT CHANGE UP (ref 4.2–5.4)
RBC # FLD: 13.5 % — SIGNIFICANT CHANGE UP (ref 11.5–14.5)
SODIUM SERPL-SCNC: 139 MMOL/L — SIGNIFICANT CHANGE UP (ref 135–146)
SP GR SPEC: 1.01 — SIGNIFICANT CHANGE UP (ref 1–1.03)
TROPONIN T SERPL-MCNC: <0.01 NG/ML — SIGNIFICANT CHANGE UP
UROBILINOGEN FLD QL: 2 MG/DL (ref 0.2–1)
WBC # BLD: 8.26 K/UL — SIGNIFICANT CHANGE UP (ref 4.8–10.8)
WBC # FLD AUTO: 8.26 K/UL — SIGNIFICANT CHANGE UP (ref 4.8–10.8)

## 2023-09-01 PROCEDURE — 85025 COMPLETE CBC W/AUTO DIFF WBC: CPT

## 2023-09-01 PROCEDURE — 87086 URINE CULTURE/COLONY COUNT: CPT

## 2023-09-01 PROCEDURE — 93010 ELECTROCARDIOGRAM REPORT: CPT

## 2023-09-01 PROCEDURE — 99285 EMERGENCY DEPT VISIT HI MDM: CPT | Mod: 25

## 2023-09-01 PROCEDURE — 36415 COLL VENOUS BLD VENIPUNCTURE: CPT

## 2023-09-01 PROCEDURE — 81001 URINALYSIS AUTO W/SCOPE: CPT

## 2023-09-01 PROCEDURE — 80053 COMPREHEN METABOLIC PANEL: CPT

## 2023-09-01 PROCEDURE — 71045 X-RAY EXAM CHEST 1 VIEW: CPT | Mod: 26

## 2023-09-01 PROCEDURE — 71045 X-RAY EXAM CHEST 1 VIEW: CPT

## 2023-09-01 PROCEDURE — 93005 ELECTROCARDIOGRAM TRACING: CPT

## 2023-09-01 PROCEDURE — 99285 EMERGENCY DEPT VISIT HI MDM: CPT | Mod: FS

## 2023-09-01 PROCEDURE — 84484 ASSAY OF TROPONIN QUANT: CPT

## 2023-09-01 NOTE — ED PROVIDER NOTE - PHYSICAL EXAMINATION
As Follows:  CONST: Well appearing in NAD  EYES: PERRL, EOMI, Sclera and conjunctiva clear.   ENT: No nasal discharge. Oropharynx normal appearing, no erythema or exudates. Uvula midline  CARD: No murmurs, rubs, or gallops; Normal rate and rhythm  RESP: BS Equal B/L, No wheezes, rhonchi or rales. No distress or accessory breathing  GI: Soft, non-tender, non-distended.  MS: Normal ROM in all extremities. No midline Cervical/Thoracic/Lumbar spinal tenderness. No signs of ecchymosis/trauma/injury  SKIN: Warm, dry, no acute rashes. MMM  NEURO: A&Ox3, No focal deficits. Hx of dementia.

## 2023-09-01 NOTE — ED PROVIDER NOTE - OBJECTIVE STATEMENT
Patient is a 89 year old female with pmhx of dementia, htn, hld, copd on 3L nasal cannula Patient is a 89 year old female with pmhx of dementia, htn, hld, copd on 3L nasal cannula presents for evaluation.  Patient states her children requested that she be brought to the hospital to be evaluated since she has been having some right-sided arm pain radiating around to her right chest described as dull in nature intermittently for the past few days to weeks.  Patient was recently diagnosed with UTI and prescribed antibiotics which patient completed.  She denies any continued urinary symptoms.  Patient family not at bedside at initial presentation.  Patient denies any other acute complaints at this time.

## 2023-09-01 NOTE — ED PROVIDER NOTE - PROGRESS NOTE DETAILS
KA - Spoke with son Oscar Carter (). Patient being treated for UTI. Called family yesterday and today about generalized body aches.     Confirmed hx of dementia, incontinence, oxygen at baseline, difficulty with ambulation and ADLs (assisted by family members multiple hours per day although patient does live alone). KA - Spoke with Daughter bedside, reiterated same as above. KA - Called [facility] at [facility], patient picked up Macrobid prescription and finished course as per patient/family. UA contaminated specimen. Patient/family informed of results. Will dc and wait for urine culture results. Return instructions given.

## 2023-09-01 NOTE — ED PROVIDER NOTE - CLINICAL SUMMARY MEDICAL DECISION MAKING FREE TEXT BOX
89-year-old female with arm pain, nontraumatic.  She recent ED treated for UTI.  Symptoms have resolved.  Patient appears very well, exam is reassuring.  Vital signs were reviewed, labs ordered and reviewed, urine appears contaminated, urine culture sent.  Given lack of UTI-like symptoms at present will hold antibiotic treatment until culture results come back. Patient and her daughter are amenable with the plan.  Eager to go home.  Strict return precautions given.

## 2023-09-01 NOTE — ED PROVIDER NOTE - NSFOLLOWUPINSTRUCTIONS_ED_ALL_ED_FT
Follow up with your Primary Care Provider.    Medical Screening Exam  A medical screening exam helps determine whether or not you need emergency medical treatment.    During the medical screening exam, a health care provider does a short physical exam and medical history to assess:    Your current symptoms.  Your overall health.    Depending on your symptoms, you may need additional tests.    What are the possible outcomes of a medical screening exam?  Your medical screening exam may determine that:    You do not need emergency treatment at this time.  You need treatment right away.  You need to be transferred to another medical center.    When should I seek medical care?  If you have a regular health care provider, make an appointment for a follow-up visit with him or her. If you do not have a regular health care provider, ask about resources in your community.    Get help right away if:  Your condition may change over time. If your condition gets worse or you develop new or troubling symptoms before you see your health care provider, go to an emergency department right away.    In an emergency:     Call 911 or have someone drive you to the nearest hospital.  Do not drive yourself.  This information is not intended to replace advice given to you by your health care provider. Make sure you discuss any questions you have with your health care provider.

## 2023-09-01 NOTE — ED ADULT NURSE NOTE - NSFALLUNIVINTERV_ED_ALL_ED
Needs seen Friday  Gargle and use mucinex plain otc until seen Bed/Stretcher in lowest position, wheels locked, appropriate side rails in place/Call bell, personal items and telephone in reach/Instruct patient to call for assistance before getting out of bed/chair/stretcher/Non-slip footwear applied when patient is off stretcher/[place] to call system/Physically safe environment - no spills, clutter or unnecessary equipment/Purposeful proactive rounding/Room/bathroom lighting operational, light cord in reach

## 2023-09-01 NOTE — ED PROVIDER NOTE - PATIENT PORTAL LINK FT
You can access the FollowMyHealth Patient Portal offered by [facility] by registering at the following website: [email or web address]. By joining [employer]’s FollowMyHealth portal, you will also be able to view your health information using other applications (apps) compatible with our system.

## 2023-09-03 LAB
CULTURE RESULTS: SIGNIFICANT CHANGE UP
SPECIMEN SOURCE: SIGNIFICANT CHANGE UP

## 2023-10-27 NOTE — DISCHARGE NOTE PROVIDER - NSDCQMAMI_CARD_ALL_CORE
No For information on Fall & Injury Prevention, visit: [email or web address] OR  [email or web address] OR  https://www.cdc.gov/steadi/patient.html

## 2024-04-09 NOTE — ED ADULT NURSE NOTE - NSFALLRSKASSESSDT_ED_ALL_ED
1. Number and complexity of problems addressed for this patient:    1.1 Moderate (At least 1)  [x ] 1 or more chronic illnesses with exacerbation, progression, or side effects of treatment  [x ] 2 or more stable chronic illnesses  [ ] 1 undiagnosed new problem with uncertain prognosis  [ ] 1 acute illness with systemic symptoms  [ ] 1 acute complicated injury  1.2 High (At least 1)   [ ] 1 or more chronic illnesses with severe exacerbation, progression, or side effects of treatment  [ ] 1 acute or chronic illnesses or injuries that may pose a threat to life or bodily function    2. Amount and/or Complexity of Data that was Reviewed and Analyzed for this case:       Moderate (1 out of 3)       High (2 out of 3)  2.1. (Any combination of 3 of the following)   [ ] Prior External notes were reviewed  [ ] Each test result was reviewed (see "LABS" and "RADIOLOGY & ADDITIONAL STUDIES" above)  [ ] The following tests were ordered and/or reviewed (Only count 1 point for ordering or reviewing a unique test):  	[ ]CBC  	[ ] Chemistry   	[ ] Imaging   	[ ] Other:   [ ] Assessment requiring an independent historian   		Name of historian and relationship:   2.2  [ ] Personally review and interpretation of  image or testing   2.3  [ ] Discussion of management or test interpretation with external physician/other qualified health care professional\appropriate source (not separately reported)    3. Risk of Complications and/or Morbidity or Mortality of for this Patient’s Management:  3.1 Moderate risk of morbidity from additional diagnostic testing or treatment (At least 1):   [ ] Prescription drug management   [ ] Decision regarding minor surgery, treatment, or procedure with identified patient or procedure risk factors  [ ] Decision regarding elective major surgery, treatment, or procedure without identified patient or procedure risk factors   [ ] Diagnosis or treatment significantly limited by social determinants of health   [ ] Other:   3.2 High risk of morbidity from additional diagnostic testing or treatment (At least 1):   [ ] Drug therapy requiring intensive monitoring for toxicity   [ ] Decision regarding elective major surgery, treatment, or procedure with identified patient or procedure risk factors   [ ] Decision regarding emergency major surgery, treatment, or procedure   [ ] Decision regarding hospitalization or escalation of hospital-level of care  [ ] Decision not to resuscitate, not to intubate, or to de-escalate care because of poor prognosis   [ ] Decision to proceed or not with artificial nutrition   [ x] Parenteral controlled substance  [ ] Other:
14-Oct-2020 03:58

## 2024-07-31 ENCOUNTER — EMERGENCY (EMERGENCY)
Facility: HOSPITAL | Age: 89
LOS: 0 days | Discharge: HOME | End: 2024-08-01
Attending: EMERGENCY MEDICINE
Payer: MEDICARE

## 2024-07-31 VITALS
DIASTOLIC BLOOD PRESSURE: 70 MMHG | RESPIRATION RATE: 17 BRPM | TEMPERATURE: 98 F | SYSTOLIC BLOOD PRESSURE: 154 MMHG | HEART RATE: 62 BPM | OXYGEN SATURATION: 93 % | WEIGHT: 164.91 LBS

## 2024-07-31 DIAGNOSIS — J44.9 CHRONIC OBSTRUCTIVE PULMONARY DISEASE, UNSPECIFIED: ICD-10-CM

## 2024-07-31 DIAGNOSIS — K25.5 CHRONIC OR UNSPECIFIED GASTRIC ULCER WITH PERFORATION: Chronic | ICD-10-CM

## 2024-07-31 DIAGNOSIS — E78.5 HYPERLIPIDEMIA, UNSPECIFIED: ICD-10-CM

## 2024-07-31 DIAGNOSIS — Z87.891 PERSONAL HISTORY OF NICOTINE DEPENDENCE: ICD-10-CM

## 2024-07-31 DIAGNOSIS — K11.20 SIALOADENITIS, UNSPECIFIED: ICD-10-CM

## 2024-07-31 DIAGNOSIS — E11.51 TYPE 2 DIABETES MELLITUS WITH DIABETIC PERIPHERAL ANGIOPATHY WITHOUT GANGRENE: ICD-10-CM

## 2024-07-31 DIAGNOSIS — Z90.49 ACQUIRED ABSENCE OF OTHER SPECIFIED PARTS OF DIGESTIVE TRACT: Chronic | ICD-10-CM

## 2024-07-31 DIAGNOSIS — Z90.710 ACQUIRED ABSENCE OF BOTH CERVIX AND UTERUS: Chronic | ICD-10-CM

## 2024-07-31 DIAGNOSIS — I10 ESSENTIAL (PRIMARY) HYPERTENSION: ICD-10-CM

## 2024-07-31 DIAGNOSIS — R22.1 LOCALIZED SWELLING, MASS AND LUMP, NECK: ICD-10-CM

## 2024-07-31 DIAGNOSIS — F03.90 UNSPECIFIED DEMENTIA, UNSPECIFIED SEVERITY, WITHOUT BEHAVIORAL DISTURBANCE, PSYCHOTIC DISTURBANCE, MOOD DISTURBANCE, AND ANXIETY: ICD-10-CM

## 2024-07-31 LAB
ALBUMIN SERPL ELPH-MCNC: 3.6 G/DL — SIGNIFICANT CHANGE UP (ref 3.5–5.2)
ALP SERPL-CCNC: 92 U/L — SIGNIFICANT CHANGE UP (ref 30–115)
ALT FLD-CCNC: 6 U/L — SIGNIFICANT CHANGE UP (ref 0–41)
ANION GAP SERPL CALC-SCNC: 12 MMOL/L — SIGNIFICANT CHANGE UP (ref 7–14)
AST SERPL-CCNC: 14 U/L — SIGNIFICANT CHANGE UP (ref 0–41)
BASOPHILS # BLD AUTO: 0.02 K/UL — SIGNIFICANT CHANGE UP (ref 0–0.2)
BASOPHILS NFR BLD AUTO: 0.3 % — SIGNIFICANT CHANGE UP (ref 0–1)
BILIRUB SERPL-MCNC: 0.8 MG/DL — SIGNIFICANT CHANGE UP (ref 0.2–1.2)
BUN SERPL-MCNC: 10 MG/DL — SIGNIFICANT CHANGE UP (ref 10–20)
CALCIUM SERPL-MCNC: 9 MG/DL — SIGNIFICANT CHANGE UP (ref 8.4–10.5)
CHLORIDE SERPL-SCNC: 105 MMOL/L — SIGNIFICANT CHANGE UP (ref 98–110)
CO2 SERPL-SCNC: 23 MMOL/L — SIGNIFICANT CHANGE UP (ref 17–32)
CREAT SERPL-MCNC: 0.8 MG/DL — SIGNIFICANT CHANGE UP (ref 0.7–1.5)
EGFR: 67 ML/MIN/1.73M2 — SIGNIFICANT CHANGE UP
EOSINOPHIL # BLD AUTO: 0 K/UL — SIGNIFICANT CHANGE UP (ref 0–0.7)
EOSINOPHIL NFR BLD AUTO: 0 % — SIGNIFICANT CHANGE UP (ref 0–8)
GLUCOSE SERPL-MCNC: 98 MG/DL — SIGNIFICANT CHANGE UP (ref 70–99)
HCT VFR BLD CALC: 40.4 % — SIGNIFICANT CHANGE UP (ref 37–47)
HGB BLD-MCNC: 13.1 G/DL — SIGNIFICANT CHANGE UP (ref 12–16)
IMM GRANULOCYTES NFR BLD AUTO: 0.3 % — SIGNIFICANT CHANGE UP (ref 0.1–0.3)
LYMPHOCYTES # BLD AUTO: 1.95 K/UL — SIGNIFICANT CHANGE UP (ref 1.2–3.4)
LYMPHOCYTES # BLD AUTO: 28 % — SIGNIFICANT CHANGE UP (ref 20.5–51.1)
MCHC RBC-ENTMCNC: 28.9 PG — SIGNIFICANT CHANGE UP (ref 27–31)
MCHC RBC-ENTMCNC: 32.4 G/DL — SIGNIFICANT CHANGE UP (ref 32–37)
MCV RBC AUTO: 89.2 FL — SIGNIFICANT CHANGE UP (ref 81–99)
MONOCYTES # BLD AUTO: 0.79 K/UL — HIGH (ref 0.1–0.6)
MONOCYTES NFR BLD AUTO: 11.4 % — HIGH (ref 1.7–9.3)
NEUTROPHILS # BLD AUTO: 4.18 K/UL — SIGNIFICANT CHANGE UP (ref 1.4–6.5)
NEUTROPHILS NFR BLD AUTO: 60 % — SIGNIFICANT CHANGE UP (ref 42.2–75.2)
NRBC # BLD: 0 /100 WBCS — SIGNIFICANT CHANGE UP (ref 0–0)
PLATELET # BLD AUTO: 234 K/UL — SIGNIFICANT CHANGE UP (ref 130–400)
PMV BLD: 10 FL — SIGNIFICANT CHANGE UP (ref 7.4–10.4)
POTASSIUM SERPL-MCNC: 4.6 MMOL/L — SIGNIFICANT CHANGE UP (ref 3.5–5)
POTASSIUM SERPL-SCNC: 4.6 MMOL/L — SIGNIFICANT CHANGE UP (ref 3.5–5)
PROT SERPL-MCNC: 7.2 G/DL — SIGNIFICANT CHANGE UP (ref 6–8)
RBC # BLD: 4.53 M/UL — SIGNIFICANT CHANGE UP (ref 4.2–5.4)
RBC # FLD: 13.8 % — SIGNIFICANT CHANGE UP (ref 11.5–14.5)
SODIUM SERPL-SCNC: 140 MMOL/L — SIGNIFICANT CHANGE UP (ref 135–146)
WBC # BLD: 6.96 K/UL — SIGNIFICANT CHANGE UP (ref 4.8–10.8)
WBC # FLD AUTO: 6.96 K/UL — SIGNIFICANT CHANGE UP (ref 4.8–10.8)

## 2024-07-31 PROCEDURE — 85025 COMPLETE CBC W/AUTO DIFF WBC: CPT

## 2024-07-31 PROCEDURE — 70491 CT SOFT TISSUE NECK W/DYE: CPT | Mod: 26,MC

## 2024-07-31 PROCEDURE — 70491 CT SOFT TISSUE NECK W/DYE: CPT | Mod: MC

## 2024-07-31 PROCEDURE — 99284 EMERGENCY DEPT VISIT MOD MDM: CPT | Mod: 25

## 2024-07-31 PROCEDURE — 96374 THER/PROPH/DIAG INJ IV PUSH: CPT | Mod: XU

## 2024-07-31 PROCEDURE — 99285 EMERGENCY DEPT VISIT HI MDM: CPT | Mod: FS

## 2024-07-31 PROCEDURE — 80053 COMPREHEN METABOLIC PANEL: CPT

## 2024-07-31 PROCEDURE — 36415 COLL VENOUS BLD VENIPUNCTURE: CPT

## 2024-07-31 RX ORDER — ACETAMINOPHEN 325 MG
975 TABLET ORAL ONCE
Refills: 0 | Status: COMPLETED | OUTPATIENT
Start: 2024-07-31 | End: 2024-07-31

## 2024-07-31 RX ORDER — AMPICILLIN AND SULBACTAM 2; 1 G/20ML; G/20ML
3 INJECTION, POWDER, FOR SOLUTION INTRAMUSCULAR; INTRAVENOUS ONCE
Refills: 0 | Status: COMPLETED | OUTPATIENT
Start: 2024-07-31 | End: 2024-07-31

## 2024-07-31 RX ADMIN — AMPICILLIN AND SULBACTAM 200 GRAM(S): 2; 1 INJECTION, POWDER, FOR SOLUTION INTRAMUSCULAR; INTRAVENOUS at 17:39

## 2024-07-31 RX ADMIN — Medication 975 MILLIGRAM(S): at 17:39

## 2024-07-31 NOTE — ED ADULT TRIAGE NOTE - GLASGOW COMA SCALE: EYE OPENING, MLM
Pt called and states she has a yeast infection and would like RP in send in medication for treatment. Pt informed that per last visit she should not be taking any medication that would cause a yeast infection. Pt instructed to call PCP or OB/GYN. Pt has no further questions/concerns at this time.    (E4) spontaneous

## 2024-07-31 NOTE — ED PROVIDER NOTE - PATIENT PORTAL LINK FT
You can access the FollowMyHealth Patient Portal offered by [facility] by registering at the following website: [email or web address]. By joining [employer]’s FollowMyHealth portal, you will also be able to view your health information using other applications (apps) compatible with our system.

## 2024-07-31 NOTE — ED PROVIDER NOTE - PROGRESS NOTE DETAILS
EP: Patient appears very well, submandibular area of erythema has decreased now, sublingual swelling has improved as well.  Results of all test were discussed with the patient, admission to the hospital was advised, however, patient adamantly refuses.  She wants to go home, reports feeling better.  I have discussed with her the dangers of going home including worsening symptoms and airway compromise, however, she is insistent on going.  She is awake and alert, capable of making decisions, her family is at the bedside and understand that she does not want to stay. Prescription for antibiotic was sent to the patient's pharmacy, she was advised to use lemon drops, warm compresses and follow-up with ENT.  Advised to return to emergency immediately for any worsening symptoms.  Patient and family verbalized understanding and are amenable with the plan.  Of note, CT report is preliminary.

## 2024-07-31 NOTE — ED PROVIDER NOTE - ATTENDING APP SHARED VISIT CONTRIBUTION OF CARE
89-year-old female past med history of COPD on home O2 3 L, diabetes, PVD, hypertension, HLD, mild dementia presenting from home with her daughter for evaluation of swelling underneath her chin and for the past several days.  Initially painless, today patient complained of pain.  No associated fever or chills, no difficulties swallowing or breathing, no change in voice or any other additional complaints. Well-nourished well-appearing elderly female resting in stretcher smiling in no acute distress, PERRL, MMM, normal phonation, no drooling or trismus, there is no mild swelling in the left sublingual region, mostly edentulous, there are 3 metal post in the mandible, one of them is very mobile and tender to palpation/percussion, there is  mild erythema of the skin of the submental area with about 1-1.5 cm tender nodule, neck is supple without meningeal signs, lungs clear to auscultation, speaking full sentences, normal work of breathing, abdomen soft/tender and nondistended, normal mood and affect, pleasant and cooperative.  Plan: Labs, antibiotic, CT scan without abscess, admit.

## 2024-07-31 NOTE — ED PROVIDER NOTE - NSFOLLOWUPINSTRUCTIONS_ED_ALL_ED_FT
Sialoadenitis    WHAT YOU NEED TO KNOW:    What is sialoadenitis? Sialoadenitis is an inflammation or infection of one or more of your salivary glands. A small stone can block the salivary gland and cause inflammation. Infection may be caused by a virus or bacteria. You can develop sialoadenitis on one or both sides of your face.    What increases my risk for sialoadenitis?    Decreased saliva caused by dehydration, radiation, or other medicines    Certain medical conditions including gout, HIV, diabetes, or tuberculosis (TB)    Bacteria or viruses    Trauma to the salivary gland    Poor oral care    Malnutrition  What are the signs and symptoms of sialoadenitis?    Pain and swelling of a salivary gland, especially during or right after eating    Bad breath or tooth pain    Pus in your mouth    Fever  How is sialoadenitis diagnosed? Your healthcare provider will ask about your symptoms and examine you. You may need a blood test to check for infection. An ultrasound or other imaging tests may show stones or any pockets of pus.    How is sialoadenitis treated?    Medicines:  Antibiotics may be given to treat a bacterial infection.    Acetaminophen decreases pain and fever. It is available without a doctor's order. Ask how much to give your child and how often to give it. Follow directions. Read the labels of all other medicines your child uses to see if they also contain acetaminophen, or ask your child's doctor or pharmacist. Acetaminophen can cause liver damage if not taken correctly.    NSAIDs, such as ibuprofen, help decrease swelling, pain, and fever. This medicine is available with or without a doctor's order. NSAIDs can cause stomach bleeding or kidney problems in certain people. If you take blood thinner medicine, always ask your healthcare provider if NSAIDs are safe for you. Always read the medicine label and follow directions.    Procedures:  Removal of one or more stones may be needed if other treatments do not work.    An incision and drainage may be needed if there is an abscess (pocket of pus) that does not respond to other treatments.  How can I manage or prevent sialoadenitis?    Drink liquids as directed. You may need to drink more liquids than usual. Ask how much liquid to drink each day and which liquids are best for you. Good choices of liquids for most people include water, tea, soup, juice, or milk.    Practice good oral care. Brush your teeth 2 times a day, 1 time in the morning and 1 time in the evening. Use a fluoride toothpaste. Floss your teeth 1 time each day, usually in the evening. Use alcohol-free mouthwash after you floss. Swish it around in your mouth for 30 seconds and spit it out.    Keep your mouth moist. Suck on hard candy or chew sugarless gum to get your saliva flowing. Sour and tart flavors such as lemon and orange will help get saliva to flow. This will help keep your mouth moist and help push out a stone blocking your salivary duct.    Apply a warm, wet cloth and massage the swollen area as directed. This may help relieve swelling and pain by pushing the pus out of the gland.  When should I seek immediate care?    You have trouble breathing or swallowing because of swelling.    When should I call my doctor?    You have trouble opening your mouth because of swelling.    Your salivary gland gets more red and hot or drains more pus.    The pain and swelling do not go away within 2 days, or they get worse.    Your mouth is very dry.    You lose movement on one side of your face.    You have questions about your condition or care.  CARE AGREEMENT:    You have the right to help plan your care. Learn about your health condition and how it may be treated. Discuss treatment options with your healthcare providers to decide what care you want to receive. You always have the right to refuse treatment.

## 2024-07-31 NOTE — ED PROVIDER NOTE - OBJECTIVE STATEMENT
89 yo female with a pmh of htn, hld, dementia presents c/o L neck mass for 3 days. pt states to notes pain under her tongue and to the mass. pt denies any other symptoms including fevers, chill, headache, recent illness/travel, cough, abdominal pain, chest pain, or SOB.

## 2024-07-31 NOTE — ED PROVIDER NOTE - DIFFERENTIAL DIAGNOSIS
Dental abscess, infected sialolith Differential Diagnosis Dental abscess, infected sialolith, Sialoadenitis

## 2024-07-31 NOTE — ED PROVIDER NOTE - CLINICAL SUMMARY MEDICAL DECISION MAKING FREE TEXT BOX
89-year-old female with submental infectious process, likely odontogenic.  Vital signs reviewed, antibiotics were ordered, imaging ordered.  Admit. 89-year-old female with sialoadenitis.  Vital signs reviewed, antibiotics were given, CT results were reviewed.  Collateral information obtained from the patient's daughter, prior records were reviewed.  Patient admitted for further management and care. 89-year-old female with sialoadenitis.  Vital signs reviewed, antibiotics were given, CT results were reviewed.  Collateral information obtained from the patient's daughter, prior records were reviewed.  Upon reevaluation symptoms have improved, patient reported feeling better.  Hospital admission was offered, however, patient adamantly refuses.  Wants to go home and try oral antibiotics .  Prescription for antibiotic was sent to her pharmacy, she was advised to follow-up with ENT, return to the emergency room immediately if any worsening symptoms.  I have tried to convince the patient to stay, family tried as well, however, she is steadfast in his decision to go home.

## 2024-07-31 NOTE — ED PROVIDER NOTE - PHYSICAL EXAMINATION
Gen: NAD, AOx3  Head: NCAT  HEENT: poor dentition, TTP to mass L submandibular mass, PERRL, oral mucosa moist, normal conjunctiva, oropharynx clear without exudate or erythema  Lung: CTAB, no respiratory distress, no wheezing, rales, rhonchi  CV: normal s1/s2, rrr, Normal perfusion, pulses 2+ throughout  Abd: soft, NTND, no CVA tenderness  Genitourinary: no pelvic tenderness  MSK: No edema, no visible deformities, full range of motion in all 4 extremities  Neuro: CN II-XII grossly intact, No focal neurologic deficits  Skin: No rash   Psych: normal affect

## 2024-08-01 VITALS
RESPIRATION RATE: 18 BRPM | SYSTOLIC BLOOD PRESSURE: 172 MMHG | OXYGEN SATURATION: 91 % | TEMPERATURE: 98 F | HEART RATE: 61 BPM | DIASTOLIC BLOOD PRESSURE: 80 MMHG

## 2024-08-01 RX ORDER — AMOXICILLIN/POTASSIUM CLAV 250-125 MG
875 TABLET ORAL
Qty: 28 | Refills: 0
Start: 2024-08-01 | End: 2024-08-14

## 2024-08-01 NOTE — ED ADULT NURSE NOTE - CAS DISCH CONDITION
Attempted to update daughter Margarita [phone]. No answer, left message requesting call back.  Verbal prelim of capsule study negative per discussion with GI. Per discussion with Dr. Grullon and pt, plan for DC tomorrow. Stable no chills/no fever
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
## 2025-02-10 NOTE — PATIENT PROFILE ADULT - NSPROMEDSBROUGHTTOHOSP_GEN_A_NUR
[facility]   Phone: [phone]   Fax: [phone]      Physical Therapy Daily Treatment Note    Date: 2/10/2025  Patient Name: Nikolai Elizabeth  : 1946   MRN: [id number]  DOInjury: 2024   DOSx: 2024-  Surgery: Right reverse total shoulder replacement.   Referring Provider:   Ozzie Mitchell MD  [address] [place] [address]     Medical Diagnosis:     Z96.611 (ICD-10-CM) - Status post reverse arthroplasty of right shoulder    Precautions: Eval and treat per handheld protocol given to pt        Outcome Measure:  Quickdash 68.18%    S:  9 weeks out from surgery. Pt reports no pain today.   O:   Time 1281-5799     Visit 3/12 Repeat outcome measure at mid point and end.    Pain See above     ROM Joint/Motion:  Right Shoulder:  AROM: 85° Forward elevation (in scapular plane),  30° ER (er tested in scapular plane),    PROM: 90° Forward elevation (in scapular plane),  45° ER,  40° IR  (ER/IR tested in scapular plane)      Left Shoulder:  AROM: 135° Forward elevation,  60° ER,  IR to 90* , abd 152*      Modalities            Manual                  Stretch      Table slides 10 x 10s hold  Flex and scap  HEP    Wall Flexion 10 x 10s hold     Wall ER stretch      Towel IR stretch      IR reaching behind back      Exercise      Shrugs AROM 2 x 10  HEP    Scap retraction X 20      Pendulum Ex      UBE      Pulleys - scap     Pulleys-IR      Supine wand chest press 2 x 10     Supine wand flex 2 x 10      Supine wand ER/IR 10 x 10s hold     Supine flexion      S-lying ABD 2 x 10      S-lying ER 2 x 10      Standing wand scaption X 10      Standing flexion      Standing wand ABD X 10      Elbow flexion/extension 2 x 10  1#  HEP with no wt    Wrist pronation/supination 2 x 10  1#    Wrist flexion/extension 2 x 10  1#    Radial and ulnar deviation 2 x 10 1#                ROWS: H Functional activities  To aid in ROM and strength needed for reaching ,  no

## 2025-04-15 NOTE — PATIENT PROFILE ADULT - VISION (WITH CORRECTIVE LENSES IF THE PATIENT USUALLY WEARS THEM):
Partially impaired: cannot see medication labels or newsprint, but can see obstacles in path, and the surrounding layout; can count fingers at arm's length Opt out